# Patient Record
Sex: FEMALE | Race: WHITE
[De-identification: names, ages, dates, MRNs, and addresses within clinical notes are randomized per-mention and may not be internally consistent; named-entity substitution may affect disease eponyms.]

---

## 2020-10-17 ENCOUNTER — HOSPITAL ENCOUNTER (EMERGENCY)
Dept: HOSPITAL 65 - ER | Age: 60
LOS: 1 days | Discharge: HOME | End: 2020-10-18
Payer: MEDICARE

## 2020-10-17 VITALS — WEIGHT: 238 LBS | HEIGHT: 67 IN | BODY MASS INDEX: 37.35 KG/M2

## 2020-10-17 DIAGNOSIS — Z88.6: ICD-10-CM

## 2020-10-17 DIAGNOSIS — Z88.8: ICD-10-CM

## 2020-10-17 DIAGNOSIS — Z79.899: ICD-10-CM

## 2020-10-17 DIAGNOSIS — N39.0: Primary | ICD-10-CM

## 2020-10-17 DIAGNOSIS — Z88.2: ICD-10-CM

## 2020-10-17 DIAGNOSIS — Z88.0: ICD-10-CM

## 2020-10-17 DIAGNOSIS — Z90.49: ICD-10-CM

## 2020-10-17 DIAGNOSIS — I11.0: ICD-10-CM

## 2020-10-17 DIAGNOSIS — I25.10: ICD-10-CM

## 2020-10-17 DIAGNOSIS — E78.00: ICD-10-CM

## 2020-10-17 DIAGNOSIS — Z90.710: ICD-10-CM

## 2020-10-17 DIAGNOSIS — Z88.5: ICD-10-CM

## 2020-10-17 DIAGNOSIS — E11.9: ICD-10-CM

## 2020-10-17 DIAGNOSIS — M79.7: ICD-10-CM

## 2020-10-17 DIAGNOSIS — Z79.84: ICD-10-CM

## 2020-10-17 DIAGNOSIS — I50.9: ICD-10-CM

## 2020-10-17 DIAGNOSIS — J44.9: ICD-10-CM

## 2020-10-17 PROCEDURE — 85025 COMPLETE CBC W/AUTO DIFF WBC: CPT

## 2020-10-17 PROCEDURE — 74176 CT ABD & PELVIS W/O CONTRAST: CPT

## 2020-10-17 PROCEDURE — 99284 EMERGENCY DEPT VISIT MOD MDM: CPT

## 2020-10-17 PROCEDURE — 96372 THER/PROPH/DIAG INJ SC/IM: CPT

## 2020-10-17 PROCEDURE — 36415 COLL VENOUS BLD VENIPUNCTURE: CPT

## 2020-10-17 PROCEDURE — 80053 COMPREHEN METABOLIC PANEL: CPT

## 2020-10-17 PROCEDURE — 87086 URINE CULTURE/COLONY COUNT: CPT

## 2020-10-17 PROCEDURE — 81000 URINALYSIS NONAUTO W/SCOPE: CPT

## 2020-10-18 VITALS — SYSTOLIC BLOOD PRESSURE: 115 MMHG | DIASTOLIC BLOOD PRESSURE: 62 MMHG

## 2020-10-18 LAB
ALP INTEST CFR SERPL: 55 U/L (ref 50–136)
ALT SERPL-CCNC: 27 U/L (ref 12–78)
APPEARANCE UR: CLEAR
AST SERPL-CCNC: 21 U/L (ref 0–35)
BASOPHILS # BLD AUTO: 0.1 10^3/UL (ref 0–0.1)
BASOPHILS NFR BLD AUTO: 0.4 % (ref 0–0.2)
BILIRUB UR STRIP.AUTO-MCNC: NEGATIVE MG/DL
CALCIUM SERPL-MCNC: 9.3 MG/DL (ref 8.4–10.5)
CO2 BLDA-SCNC: 30.7 MMOL/L (ref 20–32)
COLOR UR: YELLOW
EOSINOPHIL # BLD AUTO: 0.2 10^3/UL (ref 0–0.2)
EOSINOPHIL NFR BLD AUTO: 2 % (ref 0–5)
ERYTHROCYTE [DISTWIDTH] IN BLOOD BY AUTOMATED COUNT: 13.4 % (ref 11.5–14.5)
GLUCOSE PRE 100 G GLC PO SERPL-MCNC: 154 MG/DL (ref 70–110)
LYMPHOCYTES # BLD AUTO: 2.35 10^3/UL1 (ref 1–4.8)
LYMPHOCYTES NFR BLD AUTO: 20.4 % (ref 24–44)
MCH RBC QN AUTO: 30.4 PG (ref 26–34)
MONOCYTES # BLD AUTO: 0.8 10^3/UL (ref 0.3–0.8)
MONOCYTES NFR BLD AUTO: 6.8 % (ref 5–12)
NEUTROPHILS # BLD AUTO: 8 10^3/UL (ref 1.8–7.7)
NEUTROPHILS NFR BLD AUTO: 70 % (ref 41–85)
PLATELET # BLD AUTO: 265 10^3/UL (ref 150–400)
UROBILINOGEN UR QL STRIP.AUTO: NORMAL
YEAST URNS QL MICRO: (no result)

## 2020-10-18 NOTE — DIREP
PROCEDURE:CT ABDOMEN/PELVIS W/O CONTRAST

 

COMPARISON:[facility], CT, CT ABD/PELVIS W/O, 04/05/2018, 11:07 

AM.  [facility], CT, CT ABD/PELVIS W/O, 09/08/2017, 01:05 PM.  

[facility], CT, CT ABD/PELVIS W/O, 08/05/2017, 06:09 PM.

 

INDICATIONS:Right flank pain

 

TECHNIQUE:Axial images were created through the abdomen and pelvis without 

intravenous contrast material. 

No oral contrast was administered.

Sagittal and coronal reconstructions were performed from source images.

 

FINDINGS:

LUNG BASES:Small left pleural effusion.  The lung bases are otherwise clear.

LIVER:Normal.  No significant liver lesions are identified.  

BILIARY:Normal.  No visible dilatation or calcification.  

PANCREAS:Normal.  No lesion, fluid collection, ductal dilatation, or atrophy.  

 

SPLEEN:Normal.  No enlargement or focal lesion. 

ADRENALS:Normal.  No mass or enlargement.  

URINARY TRACT:Bilateral nonobstructing nephrolithiasis, measuring up to 3 mm 

in size.  No distal urolith or hydronephrosis noted.  Stable bilateral renal 

parenchymal scarring.

AORTA/VASCULAR:There are aortic atherosclerotic calcifications present. No 

aneurysm. 

RETROPERITONEUM:Normal.  No mass or adenopathy.  

BOWEL/MESENTERY:There is mild colonic diverticulosis without evidence for 

diverticulitis. There is no intestinal obstruction, free fluid, free air or 

mesenteric inflammatory changes.  

ABDOMINAL WALL:Normal.  No mass or hernia.  

PELVIC ORGANS:The uterus is surgically absent. No visible mass.  

BONES:Degenerative changes.  No acute abnormality noted.

OTHER:Negative.  

 

CONCLUSION:1.  Bilateral nonobstructing nephrolithiasis.  No distal urolith or 

hydronephrosis noted.

2.  Stable bilateral renal parenchymal scarring consistent with sequela of 

pyelonephritis.

3.  Mild colonic diverticulosis.

4.  Small left pleural effusion.

 

 

 

Dictated by: CRISTIAN Muro M.D. on 10/18/2020 at 01:55 AM     

Electronically Signed By: CRISTIAN Muro M.D. on 10/18/2020 at 01:59 AM

## 2020-10-18 NOTE — ER.PDOC
General


Chief Complaint:  Requesting Medical Care


Stated Complaint:  R KIDNEY PAIN


Time seen by MD:  01:22


Source:  patient


Exam Limitations:  no limitations





History of Present Illness


Initial Comments


Right flank pain for 1 week


Timing/Duration:  1 week


Severity/Quality:  moderate, sharpness


Radiation:  RLQ


Associated Symptoms:  denies symptoms


Exacerbated by:  nothing


Relieved By:  nothing


Allergies:  


Coded Allergies:  


     Penicillins (Verified  Allergy, Severe, unknown, 1/24/17)


   


   "CONVULSIONS"


     aspirin (Verified  Allergy, Severe, 1/24/17)


   


   "CONVULSIONS"


     cefamandole nafate (Verified  Allergy, Severe, 1/24/17)


     iodine (Verified  Allergy, Severe, Shortness of Breath, 1/24/17)


     quetiapine (Verified  Allergy, Severe, "I GO CRAZY, OUT OF MY HEAD", 

1/24/17)


     Cephalexin Monohydrate (Verified  Allergy, Intermediate, Hives, 1/24/17)


     egg (Verified  Allergy, Intermediate, Headache, 1/24/17)


     latex (Verified  Allergy, Intermediate, HIVES, 1/24/17)


     hydrocodone bitartrate (Verified  Allergy, Unknown, 1/24/17)


     phenazopyridine HCl (Verified  Allergy, Unknown, 1/24/17)


     codeine (Verified  Adverse Reaction, Mild, 1/24/17)


   


   "I GO CRAZY"


     fentanyl (Verified  Adverse Reaction, Mild, 1/24/17)


   


   "I GO CRAZY"


Uncoded Allergies:  


     SULFA (Allergy, Unknown, 5/28/14)


Home Meds


Active Scripts


Levofloxacin (LEVAQUIN) 750 Mg Tablet, 750 MG PO DAILY, #7 TAB 0 Refills


   Prov:MARISABEL CONTEH MD         5/7/20


Oxcarbazepine (OXCARBAZEPINE) 150 Mg Tablet, 300 MG PO DAILY for 30 Days, TAB


   Prov:DWAIN WINKLER MD         1/10/20


Losartan Potassium (COZAAR) 50 Mg Tablet, 100 MG PO DAILY for 30 Days, #30 TAB


   Prov:DWAIN WINKLER MD         1/10/20


Prednisone (PREDNISONE) 20 Mg Tablet, 40 MG PO DAILY24 for 30 Days


   Prov:DWAIN WINKLER MD         12/5/19


Pantoprazole Sodium (PROTONIX) 40 Mg Tablet.dr, 40 MG PO DAILY for 30 Days, #30


   Prov:DWAIN WINKLER MD         12/5/19


Lancets (ACCU-CHEK) 1 Each Each, 1 EACH MC Q6 for 30 Days, #30 EACH


   Prov:DWAIN WINKLER MD         7/12/19


Amlodipine Besylate (NORVASC) 5 Mg Tablet, 5 MG PO DAILY for 30 Days, TABLET


   Prov:DWAIN WINKLER MD         7/12/19


Reported Medications


Carvedilol 25MG (COREG 25MG) 25 Mg Tablet, 1 TAB PO BID, #60 TAB 5 Refills


   12/3/19


Clonidine Hcl (CLONIDINE HCL) 0.2 Mg Tablet, 1 TAB PO HS, #30 TAB 2 Refills


   12/2/19


Tramadol Hcl (TRAMADOL HCL) 50 Mg Tablet, 50 MG PO PRN PRN for PAIN, TABLET


   12/2/19


Metformin Hcl (METFORMIN HCL) 500 Mg Tablet, 1 TAB PO BID, #60 TAB 3 Refills


   12/2/19


Rosuvastatin 10MG (CRESTOR 10MG) 10 Mg Tablet, 1 TAB PO DAILY, #30 TAB 5 Refills


   12/2/19


Duloxetine Hcl (CYMBALTA) 60 Mg Capsule.dr, 60 MG PO DAILY24


   7/10/19


Fenofibrate (FENOFIBRATE) 160 Mg Tablet, 160 MG PO DAILY24, TABLET


   7/10/19


Ondansetron (ZOFRAN ODT) 8 Mg Tab.rapdis, 8 MG PO DAILY24 for N/V, TAB


   8/6/18


Dasatinib (SPRYCEL) 100 Mg Tablet, 90 MG PO DAILY24, TABLET


   8/6/18


Potassium Citrate (POTASSIUM CITRATE) 10 Meq Tablet.er, 10 MEQ PO DAILY24


   8/6/18


Bupropion Hcl (BUPROPION XL) 300 Mg Tab.er.24h, 300 MG PO DAILY24


   8/6/18


Ondansetron (ZOFRAN ODT) 8 Mg Tab.rapdis, 8 MG PO DAILY24 for N/V, TAB


   8/6/18


Dasatinib (SPRYCEL) 80 Mg Tablet, 90 MG PO DAILY24, TABLET


   8/6/18


Albuterol Sulfate (VENTOLIN HFA) 18 Gm Hfa.aer.ad, 90 MCG IH Q6HR PRN for 

SHORTNESS OF BREATH


   8/6/18


Allopurinol (ALLOPURINOL) 300 Mg Tablet, 1 TAB PO DAILY


   8/6/18


Duloxetine Hcl (CYMBALTA) 60 Mg Capsule.dr, 60 MG PO DAILY


   1/24/17


Tiotropium Bromide (SPIRIVA) 18 Mcg Cap.w.dev, 1 CAP IH DAILY


   10/14/16


Furosemide (LASIX) 20 Mg Tablet, 1 TAB PO DAILY


   9/24/15


Tizanidine Hcl (TIZANIDINE HCL) 4 Mg Tablet, 1 TAB PO TID PRN for MUSCLE SPASM


   9/24/15


Gabapentin (NEURONTIN) 300 Mg Capsule, 2 CAP PO TID


   11/21/14


Alprazolam (XANAX) 2 Mg Tablet, 2 MG PO TID, TABLET


   11/17/14


Linaclotide (LINZESS) 290 Mcg Capsule, 290 MCG PO DAILY, CAPSULE


   11/17/14


Cholecalciferol (Vitamin D3) (VITAMIN D) 10,000 Unit Capsule, 35491 UNIT PO 3 

TIMES A WEEK, CAPSULE


   11/17/14


Levothyroxine Sodium (LEVOTHYROXINE SODIUM) 200 Mcg Tablet, 150 MCG PO DAILY, 

TABLET


   6/13/14


Vital Signs





First Vital Signs








  Date Time  Temp Pulse Resp B/P (MAP) Pulse Ox O2 Delivery O2 Flow Rate FiO2


 


10/18/20 01:28 98.2 94 18 115/62 (79) 95   








Last Vital Signs








  Date Time  Temp Pulse Resp B/P (MAP) Pulse Ox O2 Delivery O2 Flow Rate FiO2


 


10/18/20 01:28 98.2 94 18     


 


10/18/20 01:28     95   


 


10/18/20 01:28    115/62 (79)    











Past Medical History


Medical History:  cancer, coronary artery disease, cardiac problems, congestive 

heart failure, COPD, diabetes, fibromyalgia, high cholesterol, hypertension, 

renal disease, other


Surgical History:  appendectomy, cholecystectomy, hysterectomy, tonsillectomy, 

other





Social History


Drug Use:  none





Constitutional:  no symptoms reported


EENTM:  no symptoms reported


Respiratory:  no symptoms reported


Cardiovascular:  no symptoms reported


Gastrointestinal:  see HPI


Genitourinary:  see HPI


Musculoskeletal:  no symptoms reported


All Other Systems:  Reviewed and Negative





Physical Exam


General Appearance:  Obese


Neck:  Non-Tender, Full Range of Motion, Supple, Normal Inspection


Respiratory:  chest non-tender, lungs clear, normal breath sounds, no 

respiratory distress, no accessory muscle use


Cardiovascular:  Normal Peripheral Pulses, Regular Rate, Rhythm, No Edema, No 

Gallop, No JVD, No Murmur


Gastrointestinal:  Normal Bowel Sounds, No Organomegaly, No Pulsatile Mass, 

Guarding, Tenderness (RLQ)


Back:  CVA Tenderness (R)


Extremities:  Normal Range of Motion, Non-Tender, Normal Inspection, No Pedal 

Edema, No Calf Tenderness, Normal Capillary Refill, Pelvis Stable


Neurologic/Psychiatric:  CNs II-XII NML as Tested, No Motor/Sensory Deficits, 

Alert, Normal Mood/Affect, Oriented x 3


Skin:  Normal Color, Warm/Dry


Lymphatic:  No Adenopathy





Results/Orders


Results/Orders





Orders - STEPHANIE ALLRED MD


Cbc With Auto Diff (10/18/20 01:18)


Comprehensive Metabolic Panel (10/18/20 01:18)


Urinalysis (10/18/20 01:18)


Ct Abd/Pelvis Wo Iv Contrast (10/18/20 01:18)


Morphine Sulfate (Morphine Sulfate) (10/18/20 01:18)


Morphine Sulfate (Morphine Sulfate) (10/18/20 01:47)


Urine Culture (10/18/20 01:35)





Vital Signs








  Date Time  Temp Pulse Resp B/P (MAP) Pulse Ox O2 Delivery O2 Flow Rate FiO2


 


10/18/20 01:28 98.2 94 18     


 


10/18/20 01:28 98.2 94 18  95   


 


10/18/20 01:28 98.2 94 18 115/62 (79) 95   








Administered Medications








 Medications


  (Trade)  Dose


 Ordered  Sig/Tesfaye


 Route


 PRN Reason  Start Time


 Stop Time Status Last Admin


Dose Admin


 


 Morphine Sulfate


  (Morphine


 Sulfate)  4 mg  STAT  STAT


 IM


   10/18/20 01:18


 10/18/20 01:22 DC 10/18/20 01:55


4 MG








                                Laboratory Tests








Test


 10/18/20


01:35 10/18/20


01:36


 


Urine Collection Type VOID   


 


Urine Color


 YELLOW


(YELLOW) 





 


Urine Appearance CLEAR (CLEAR)   


 


Urine Bilirubin


 NEGATIVE MG/DL


(NEGATIVE) 





 


Urine Ketones


 NEGATIVE


(NEGATIVE) 





 


Urine Specific Gravity


 1.020


(1.005-1.035) 





 


Urine pH 6.5 (5.0-6.0)   


 


Urine Protein


 NEGATIVE


(NEGATIVE) 





 


Urine Urobilinogen


 NORMAL


(NEGATIVE) 





 


Urine Nitrate


 NEGATIVE


(NEGATIVE) 





 


Urine Leukocyte Esterase


 25 /uL  TRACE


(NEGATIVE) 





 


Urine Blood


 NEGATIVE


(NEGATIVE) 





 


Urine RBC


 0-2 RBC/HPF


(NONE SEEN) 





 


Urine WBC


 5-10 WBC/HPF


(0-2)  H 





 


Urine Squamous Epithelial


Cells FEW #/HPF


(FEW) 





 


Urine Bacteria


 FEW (NONE


SEEN)  H 





 


Urine Yeast FEW   


 


Urine Glucose


 NEGATIVE


(NEGATIVE) 





 


White Blood Count


 


 11.5 10^3/uL


(4.5-11.0)  H


 


Red Blood Count


 


 3.85 10^6/uL


(4.00-5.20)  L


 


Hemoglobin


 


 11.7 g/dL


(12.0-15.0)  L


 


Hematocrit


 


 36.6 %


(36.0-46.0)


 


Mean Corpuscular Volume


 


 95.1 fL


()


 


Mean Corpuscular Hemoglobin


 


 30.4 pg


(26-34)


 


Mean Corpuscular Hemoglobin


Concent 


 32.0 g/dL


(33-36.5)  L


 


Red Cell Distribution Width


 


 13.4 %


(11.5-14.5)


 


Platelet Count


 


 265 10^3/uL


(150-400)


 


Mean Platelet Volume


 


 9.4 fL


(7.8-11.0)


 


Neutrophils (%) (Auto)


 


 70.0 %


(41.0-85.0)


 


Lymphocytes (%) (Auto)


 


 20.4 %


(24.0-44.0)  L


 


Monocytes (%) (Auto)


 


 6.8 %


(5.0-12.0)


 


Neutrophils # (Auto)


 


 8.0 10^3/uL


(1.8-7.7)  H


 


Lymphocytes # (Auto)


 


 2.35 10^3/uL1


(1.0-4.8)


 


Monocytes # (Auto)


 


 0.8 10^3/uL


(0.3-0.8)


 


Absolute Immature Granulocyte


(auto 


 0.05 10^3 u/L


(0-2)


 


Absolute Eosinophils (auto)


 


 0.2 10^3/uL


(0.0-0.2)


 


Immature Granulocytes %


 


 0.40 %


(0.00-0.50)


 


Eosinophils %


 


 2.0 %


(0.0-5.0)


 


Basophils %


 


 0.4 %


(0.0-0.2)  H


 


Basophils #


 


 0.1 10^3/uL


(0.0-0.1)


 


Sodium Level


 


 143 mmol/L


(132-145)


 


Potassium Level


 


 4.4 mmol/L


(3.6-5.2)


 


Chloride Level


 


 102.0 mmol/L


()


 


Carbon Dioxide Level


 


 30.7 mmol/L


(20.0-32)


 


Anion Gap  14.7  


 


Blood Urea Nitrogen


 


 12 mg/dL


(7-18)


 


Creatinine


 


 1.33 mg/dL


(0.59-1.40)


 


Estimated GFR (


American) 


 49.6 (>/=60)  





 


Est GFR (CKD-EPI)(Non-Afr


American) 


 41.0 (>/=60)  





 


BUN/Creatinine Ratio  9.0  


 


Glucose Level


 


 154 mg/dL


()  H


 


Calcium Level


 


 9.3 mg/dL


(8.4-10.5)


 


Total Bilirubin


 


 0.2 mg/dL


(0.2-1.0)


 


Aspartate Amino Transferase


(AST) 


 21 U/L (0-35)  





 


Alanine Aminotransferase (ALT)


 


 27 U/L (12-78)





 


Alkaline Phosphatase


 


 55 U/L


()


 


Total Protein


 


 7.6 g/dL


(6.4-8.2)


 


Albumin


 


 4.1 g/dL


(3.4-5.0)


 


Globulin  3.5  


 


Albumin/Globulin Ratio  1.171  











Progress


Progress


CT abdomen/pelvis: 1.  Bilateral nonobstructing nephrolithiasis.  No distal 

urolith or 


hydronephrosis noted.


2.  Stable bilateral renal parenchymal scarring consistent with sequela of 


pyelonephritis.


3.  Mild colonic diverticulosis.


4.  Small left pleural effusion.





ER DEPART


Departure


Time of Disposition:  02:13


Disposition:  01 HOME, SELF-CARE


Impression:  


   Primary Impression:  


   UTI (lower urinary tract infection)


   Additional Impression:  


   Right flank pain


Condition:  Stable


Referrals:  


SAURABH BOWERS NP (PCP)


PRIMARY CARE PROVIDER





Additional Instructions:  


Macrobid


F/U with your PCP in 1 week


Return to ED if worsening symptoms or concerns


Duration or Time Spent with Pa:  45 min





Problem Qualifiers











STEPHANIE ALLRED MD                Oct 18, 2020 01:24

## 2020-12-16 ENCOUNTER — HOSPITAL ENCOUNTER (OUTPATIENT)
Dept: HOSPITAL 65 - NPLAB | Age: 60
Discharge: HOME | End: 2020-12-16
Attending: NURSE PRACTITIONER
Payer: MEDICARE

## 2020-12-16 ENCOUNTER — HOSPITAL ENCOUNTER (OUTPATIENT)
Dept: HOSPITAL 65 - LAB | Age: 60
Discharge: HOME | End: 2020-12-16
Attending: SPECIALIST
Payer: MEDICARE

## 2020-12-16 DIAGNOSIS — I10: ICD-10-CM

## 2020-12-16 DIAGNOSIS — R05: Primary | ICD-10-CM

## 2020-12-16 DIAGNOSIS — Z79.899: ICD-10-CM

## 2020-12-16 DIAGNOSIS — R50.9: ICD-10-CM

## 2020-12-16 DIAGNOSIS — Z20.828: Primary | ICD-10-CM

## 2020-12-16 DIAGNOSIS — E11.9: ICD-10-CM

## 2020-12-16 LAB
ALP INTEST CFR SERPL: 47 U/L (ref 50–136)
ALT SERPL-CCNC: 23 U/L (ref 12–78)
AST SERPL-CCNC: 12 U/L (ref 0–35)
BASOPHILS # BLD AUTO: 0 10^3/UL (ref 0–0.1)
BASOPHILS NFR BLD AUTO: 0.5 % (ref 0–0.2)
CALCIUM SERPL-MCNC: 9.8 MG/DL (ref 8.4–10.5)
CO2 BLDA-SCNC: 36.4 MMOL/L (ref 20–32)
EOSINOPHIL # BLD AUTO: 0.2 10^3/UL (ref 0–0.2)
EOSINOPHIL NFR BLD AUTO: 3.2 % (ref 0–5)
ERYTHROCYTE [DISTWIDTH] IN BLOOD BY AUTOMATED COUNT: 13.6 % (ref 11.5–14.5)
GLUCOSE PRE 100 G GLC PO SERPL-MCNC: 208 MG/DL (ref 70–110)
LYMPHOCYTES # BLD AUTO: 1.57 10^3/UL1 (ref 1–4.8)
LYMPHOCYTES NFR BLD AUTO: 25 % (ref 24–44)
MCH RBC QN AUTO: 30.4 PG (ref 26–34)
MONOCYTES # BLD AUTO: 0.6 10^3/UL (ref 0.3–0.8)
MONOCYTES NFR BLD AUTO: 8.8 % (ref 5–12)
NEUTROPHILS # BLD AUTO: 3.9 10^3/UL (ref 1.8–7.7)
NEUTROPHILS NFR BLD AUTO: 62.3 % (ref 41–85)
PLATELET # BLD AUTO: 211 10^3/UL (ref 150–400)

## 2020-12-16 PROCEDURE — 85379 FIBRIN DEGRADATION QUANT: CPT

## 2020-12-16 PROCEDURE — 83615 LACTATE (LD) (LDH) ENZYME: CPT

## 2020-12-16 PROCEDURE — 84484 ASSAY OF TROPONIN QUANT: CPT

## 2020-12-16 PROCEDURE — 85025 COMPLETE CBC W/AUTO DIFF WBC: CPT

## 2020-12-16 PROCEDURE — 87635 SARS-COV-2 COVID-19 AMP PRB: CPT

## 2020-12-16 PROCEDURE — 87426 SARSCOV CORONAVIRUS AG IA: CPT

## 2020-12-16 PROCEDURE — 36415 COLL VENOUS BLD VENIPUNCTURE: CPT

## 2020-12-16 PROCEDURE — 84145 PROCALCITONIN (PCT): CPT

## 2020-12-16 PROCEDURE — 82728 ASSAY OF FERRITIN: CPT

## 2020-12-16 PROCEDURE — 80053 COMPREHEN METABOLIC PANEL: CPT

## 2020-12-16 PROCEDURE — 86140 C-REACTIVE PROTEIN: CPT

## 2020-12-18 ENCOUNTER — HOSPITAL ENCOUNTER (OUTPATIENT)
Dept: HOSPITAL 65 - RAD | Age: 60
Discharge: HOME | End: 2020-12-18
Attending: SPECIALIST
Payer: MEDICARE

## 2020-12-18 DIAGNOSIS — J90: ICD-10-CM

## 2020-12-18 DIAGNOSIS — R50.9: ICD-10-CM

## 2020-12-18 DIAGNOSIS — R91.1: Primary | ICD-10-CM

## 2020-12-18 DIAGNOSIS — R05: ICD-10-CM

## 2020-12-18 DIAGNOSIS — R91.8: ICD-10-CM

## 2020-12-18 PROCEDURE — 71250 CT THORAX DX C-: CPT

## 2020-12-18 NOTE — DIREP
PROCEDURE:CT CHEST WITHOUT CONTRAST

 

TECHNIQUE:Axial cuts were obtained through the chest, without intravenous 

contrast material.  The images were viewed at lung and soft tissue settings.  

Sagittal and coronal reconstructions are provided.  

 

COMPARISON:[facility], CT, CT-CHEST W/O ABD W/O PEL W/O CONTRAST, 

01/25/2013, 06:43 PM.  [facility], CT, CT ABD/PELVIS W/O, 

10/18/2020, 01:35 AM.  [facility], CR, XRAY CHEST 2 VWS, 

11/05/2020, 06:39 AM.

 

INDICATIONS:R05 COUGH, R50.9 FEVER

 

FINDINGS:

LUNGS:Very faint diffuse ground-glass appearance of the right upper lobe.  

Right middle lobe nodule 0.6 cm.  Stable small left pleural effusion.

CARDIAC:Normal size heart, mild coronary artery calcifications and normal 

pulmonary vascularity.  

THORACIC AORTA:Calcification without dilatation.

MEDIASTINUM/MARCI:No pathologic adenopathy.

CHEST WALL:Normal.

LIMITED ABDOMEN:Cholecystectomy.

BONES:Lower thoracic spondylosis.

THYROID:Normal.

OTHER:No additional findings.  

 

CONCLUSION:

1.  Mild diffuse right upper lobe ground-glass infiltrates.

2.  0.5 cm right middle lobe nodule.

3.  Small left pleural effusion.  

 

 

 

Dictated by: Anastasia Gomez MD on 12/18/2020 at 04:18 PM     

Electronically Signed By: Anastasia Gomez MD on 12/18/2020 at 04:29 PM

## 2021-01-06 ENCOUNTER — HOSPITAL ENCOUNTER (INPATIENT)
Dept: HOSPITAL 65 - MS | Age: 61
LOS: 2 days | Discharge: HOME | DRG: 660 | End: 2021-01-08
Attending: INTERNAL MEDICINE | Admitting: INTERNAL MEDICINE
Payer: MEDICARE

## 2021-01-06 VITALS — SYSTOLIC BLOOD PRESSURE: 159 MMHG | DIASTOLIC BLOOD PRESSURE: 79 MMHG

## 2021-01-06 VITALS — SYSTOLIC BLOOD PRESSURE: 154 MMHG | DIASTOLIC BLOOD PRESSURE: 86 MMHG

## 2021-01-06 VITALS — HEIGHT: 67 IN | WEIGHT: 251.31 LBS | BODY MASS INDEX: 39.44 KG/M2

## 2021-01-06 DIAGNOSIS — E86.0: ICD-10-CM

## 2021-01-06 DIAGNOSIS — Z79.899: ICD-10-CM

## 2021-01-06 DIAGNOSIS — Z82.49: ICD-10-CM

## 2021-01-06 DIAGNOSIS — N13.2: Primary | ICD-10-CM

## 2021-01-06 DIAGNOSIS — Z88.2: ICD-10-CM

## 2021-01-06 DIAGNOSIS — Z88.5: ICD-10-CM

## 2021-01-06 DIAGNOSIS — Z88.1: ICD-10-CM

## 2021-01-06 DIAGNOSIS — E11.9: ICD-10-CM

## 2021-01-06 DIAGNOSIS — Z87.01: ICD-10-CM

## 2021-01-06 DIAGNOSIS — Z82.5: ICD-10-CM

## 2021-01-06 DIAGNOSIS — Z90.710: ICD-10-CM

## 2021-01-06 DIAGNOSIS — Z82.3: ICD-10-CM

## 2021-01-06 DIAGNOSIS — I50.32: ICD-10-CM

## 2021-01-06 DIAGNOSIS — I11.0: ICD-10-CM

## 2021-01-06 DIAGNOSIS — Z90.49: ICD-10-CM

## 2021-01-06 DIAGNOSIS — G47.33: ICD-10-CM

## 2021-01-06 DIAGNOSIS — C92.10: ICD-10-CM

## 2021-01-06 DIAGNOSIS — J44.9: ICD-10-CM

## 2021-01-06 DIAGNOSIS — Z91.041: ICD-10-CM

## 2021-01-06 DIAGNOSIS — Z91.040: ICD-10-CM

## 2021-01-06 DIAGNOSIS — Z91.012: ICD-10-CM

## 2021-01-06 DIAGNOSIS — Z82.0: ICD-10-CM

## 2021-01-06 DIAGNOSIS — E03.9: ICD-10-CM

## 2021-01-06 DIAGNOSIS — Z99.81: ICD-10-CM

## 2021-01-06 DIAGNOSIS — K92.1: ICD-10-CM

## 2021-01-06 DIAGNOSIS — Z88.0: ICD-10-CM

## 2021-01-06 DIAGNOSIS — Z88.8: ICD-10-CM

## 2021-01-06 DIAGNOSIS — Z80.0: ICD-10-CM

## 2021-01-06 DIAGNOSIS — E66.9: ICD-10-CM

## 2021-01-06 DIAGNOSIS — Z98.891: ICD-10-CM

## 2021-01-06 DIAGNOSIS — Z88.6: ICD-10-CM

## 2021-01-06 LAB
ALP INTEST CFR SERPL: 43 U/L (ref 50–136)
ALT SERPL-CCNC: 23 U/L (ref 12–78)
APPEARANCE UR: (no result)
AST SERPL-CCNC: 15 U/L (ref 0–35)
BASOPHILS # BLD AUTO: 0 10^3/UL (ref 0–0.1)
BASOPHILS NFR BLD AUTO: 0.3 % (ref 0–0.2)
BILIRUB UR STRIP.AUTO-MCNC: (no result) MG/DL
CALCIUM SERPL-MCNC: 9.9 MG/DL (ref 8.4–10.5)
CO2 BLDA-SCNC: 34.3 MMOL/L (ref 20–32)
COLOR UR: (no result)
EOSINOPHIL # BLD AUTO: 0.2 10^3/UL (ref 0–0.2)
EOSINOPHIL NFR BLD AUTO: 1.5 % (ref 0–5)
ERYTHROCYTE [DISTWIDTH] IN BLOOD BY AUTOMATED COUNT: 13.7 % (ref 11.5–14.5)
GLUCOSE PRE 100 G GLC PO SERPL-MCNC: 182 MG/DL (ref 70–110)
LYMPHOCYTES # BLD AUTO: 1.68 10^3/UL1 (ref 1–4.8)
LYMPHOCYTES NFR BLD AUTO: 15.2 % (ref 24–44)
MCH RBC QN AUTO: 30.2 PG (ref 26–34)
MONOCYTES # BLD AUTO: 0.7 10^3/UL (ref 0.3–0.8)
MONOCYTES NFR BLD AUTO: 5.9 % (ref 5–12)
NEUTROPHILS # BLD AUTO: 8.5 10^3/UL (ref 1.8–7.7)
NEUTROPHILS NFR BLD AUTO: 76.8 % (ref 41–85)
PLATELET # BLD AUTO: 207 10^3/UL (ref 150–400)
UROBILINOGEN UR QL STRIP.AUTO: NORMAL

## 2021-01-06 PROCEDURE — A4217 STERILE WATER/SALINE, 500 ML: HCPCS

## 2021-01-06 PROCEDURE — 85651 RBC SED RATE NONAUTOMATED: CPT

## 2021-01-06 PROCEDURE — 81000 URINALYSIS NONAUTO W/SCOPE: CPT

## 2021-01-06 PROCEDURE — 85610 PROTHROMBIN TIME: CPT

## 2021-01-06 PROCEDURE — 74176 CT ABD & PELVIS W/O CONTRAST: CPT

## 2021-01-06 PROCEDURE — C1769 GUIDE WIRE: HCPCS

## 2021-01-06 PROCEDURE — 80053 COMPREHEN METABOLIC PANEL: CPT

## 2021-01-06 PROCEDURE — 94640 AIRWAY INHALATION TREATMENT: CPT

## 2021-01-06 PROCEDURE — 84145 PROCALCITONIN (PCT): CPT

## 2021-01-06 PROCEDURE — 71046 X-RAY EXAM CHEST 2 VIEWS: CPT

## 2021-01-06 PROCEDURE — 36415 COLL VENOUS BLD VENIPUNCTURE: CPT

## 2021-01-06 PROCEDURE — 76000 FLUOROSCOPY <1 HR PHYS/QHP: CPT

## 2021-01-06 PROCEDURE — 82272 OCCULT BLD FECES 1-3 TESTS: CPT

## 2021-01-06 PROCEDURE — 86140 C-REACTIVE PROTEIN: CPT

## 2021-01-06 PROCEDURE — 83630 LACTOFERRIN FECAL (QUAL): CPT

## 2021-01-06 PROCEDURE — 85025 COMPLETE CBC W/AUTO DIFF WBC: CPT

## 2021-01-06 PROCEDURE — 74420 UROGRAPHY RTRGR +-KUB: CPT

## 2021-01-06 PROCEDURE — 87230 TOXIN/ANTITOXIN ASY TISS CUL: CPT

## 2021-01-06 PROCEDURE — 82948 REAGENT STRIP/BLOOD GLUCOSE: CPT

## 2021-01-06 PROCEDURE — 87045 FECES CULTURE AEROBIC BACT: CPT

## 2021-01-06 PROCEDURE — C2617 STENT, NON-COR, TEM W/O DEL: HCPCS

## 2021-01-06 PROCEDURE — 93005 ELECTROCARDIOGRAM TRACING: CPT

## 2021-01-06 PROCEDURE — 80048 BASIC METABOLIC PNL TOTAL CA: CPT

## 2021-01-06 PROCEDURE — C1758 CATHETER, URETERAL: HCPCS

## 2021-01-06 PROCEDURE — 87086 URINE CULTURE/COLONY COUNT: CPT

## 2021-01-06 RX ADMIN — MORPHINE SULFATE PRN MG: 4 INJECTION, SOLUTION INTRAMUSCULAR; INTRAVENOUS at 19:07

## 2021-01-06 RX ADMIN — MORPHINE SULFATE PRN MG: 4 INJECTION, SOLUTION INTRAMUSCULAR; INTRAVENOUS at 23:06

## 2021-01-06 NOTE — PCM.EKG
[facility]

                                       

Test Date:    2021               Test Time:    15:37:48

Pat Name:     MCKENZIE THURMAN          Department:   

Patient ID:   [id number]          Room:         331 A

Gender:       F                        Technician:   JESSICA

:          1960               Requested By: MARISABEL BLACKMON

Order Number: [id number]           Reading MD:   Marisabel Blackmon

                                 Measurements

Intervals                              Axis          

Rate:         86                       P:            67

MA:           170                      QRS:          27

QRSD:         118                      T:            51

QT:           405                                    

QTc:          485                                    

                           Interpretive Statements

Sinus rhythm

Consider left atrial enlargement

Incomplete right bundle branch block

Compared to ECG 2020 13:36:07

Incomplete right bundle-branch block now present

Electronically Signed On 2021 12:01:48 CST by Marisabel Blackmon



Please click the below link to view image of tracing.

## 2021-01-06 NOTE — PCM.HP
HISTORY & PHYSICAL


HISTORY & PHYSICAL


DATE: 2021





Patient is being admitted as an inpatient to [facility]





ADMITTING DIAGNOSES: Left lower quadrant pain with nausea vomiting dehydration 

with hematochezia





CHIEF COMPLAINT: Belly pain vomiting and blood in the stools





HISTORY OF PRESENT ILLNESS: 60-year-old female with history of CML who just 

finished antibiotics for a recent pneumonia.  She started to have increasing 

left lower quadrant pain with nausea and nonbilious vomiting along with blood in

her stool.  This has been going on for the past week.  She does report fever and

chills with some night sweats.  She denies any urinary symptoms and she denies 

any chest pain or shortness of breath.  She denies any recent trauma or recent 

travel.  There have been no sick contacts around her.  She was on Levaquin for 

her pneumonia recently.





PAST MEDICAL HISTORY: CML, COPD, recurrent pneumonias, hypothyroid





PAST SURGICAL HISTORY: Cholecystectomy, multiple hernia repairs with the last 

one having a mesh placement, total hysterectomy,  x2, exploratory 

laparotomy x2, breast biopsies which were all negative, colonoscopy





ALLERGIES: Penicillin, aspirin, codeine, iodine, fentanyl, Seroquel, Keflex





MEDICATIONS: I have reviewed her home medication list in [facility]





SOCIAL HISTORY: No tobacco, no drugs, no alcohol





OB history: She is a 





FAMILY HISTORY: Mom is  with a diagnosis of pulmonary fibrosis, maternal

grandmother with history of pancreatic cancer





PHYSICAL EXAMINATION:


VITAL SIGNS: Pulse 91, respirations 18, O2 sat of 99%, she is afebrile


HEENT: Oropharynx is dry but patent, nares are patent, no maxillary sinus 

tenderness, no nasal congestion noted, TMs are patent


NECK: No JVD noted, no bruits, no lymphadenopathy


HEART: S1 and S2 audible, she was not tachycardic, no gallop rhythms


LUNGS: CTA bilaterally


ABDOMEN: Bowel sounds are present, soft abdomen, she was tender to the left 

lower quadrant but no significant rebound noted


EXTREMITIES: Minimal edema to her legs, no purpura, no petechia, no cyanosis





LABORATORY DATA: WBC 11.0, hemoglobin 11.7, platelet count 207, sed rate 13, PT 

11.6, INR 1.1, sodium 140, potassium 3.2, chloride 95, BUN 17, creatinine 1.5, 

estimated GFR 36, glucose 182, LFTs are normal, albumin 4.2, procalcitonin less 

than 0.05





EKG: No tachycardia no significant ST segment changes noted





ASSESSMENT: We have this 60-year-old female with onset of left lower quadrant 

pain with hematochezia and nausea vomiting and dehydration





PLAN: I will go ahead and admit her to the hospital and start her on IV fluids 

for her the dehydration and some pain control.  I will schedule a CAT scan with 

contrast to evaluate for diverticulitis/colitis and draw some C. difficile toxin

panel on her as well with her recent use of antibiotics.





Total time spent on pt = 60 mins











MARISABEL CONTEH MD               2021 17:31

## 2021-01-06 NOTE — DIREP
PROCEDURE:CHEST X-RAY, PA & LATERAL

 

 

COMPARISON:[facility], CR, XRAY CHEST 2 VWS, 11/05/2020, 06:39 

AM.  [facility], CR, XRAY CHEST 2 VWS, 05/04/2020, 04:59 PM.

 

INDICATIONS:cough

 

FINDINGS:

LUNGS/PLEURA:No significant pulmonary parenchymal abnormalities. No effusions.

VASCULATURE:The pulmonary vasculature is normal.  

CARDIAC:The heart is borderline enlarged.  

MEDIASTINUM:Normal.  No visible mass or adenopathy.  

BONES:Normal.  No fracture or visible bony lesion.  

OTHER:Negative.  

 

CONCLUSION:

1.  Borderline cardiomegaly, without evidence of cardiac decompensation at this 

time.  

2.  No acute pulmonary abnormality is identified.

 

 

 

Dictated by: Elijah Carrizales MD on 01/06/2021 at 10:53 PM     

Electronically Signed By: Elijah Carrizales MD on 01/06/2021 at 10:54 PM

## 2021-01-07 VITALS — SYSTOLIC BLOOD PRESSURE: 140 MMHG | DIASTOLIC BLOOD PRESSURE: 63 MMHG

## 2021-01-07 VITALS — DIASTOLIC BLOOD PRESSURE: 85 MMHG | SYSTOLIC BLOOD PRESSURE: 161 MMHG

## 2021-01-07 VITALS — SYSTOLIC BLOOD PRESSURE: 166 MMHG | DIASTOLIC BLOOD PRESSURE: 73 MMHG

## 2021-01-07 VITALS — SYSTOLIC BLOOD PRESSURE: 150 MMHG | DIASTOLIC BLOOD PRESSURE: 65 MMHG

## 2021-01-07 VITALS — DIASTOLIC BLOOD PRESSURE: 83 MMHG | SYSTOLIC BLOOD PRESSURE: 164 MMHG

## 2021-01-07 VITALS — SYSTOLIC BLOOD PRESSURE: 158 MMHG | DIASTOLIC BLOOD PRESSURE: 94 MMHG

## 2021-01-07 VITALS — SYSTOLIC BLOOD PRESSURE: 154 MMHG | DIASTOLIC BLOOD PRESSURE: 71 MMHG

## 2021-01-07 VITALS — DIASTOLIC BLOOD PRESSURE: 70 MMHG | SYSTOLIC BLOOD PRESSURE: 160 MMHG

## 2021-01-07 VITALS — DIASTOLIC BLOOD PRESSURE: 86 MMHG | SYSTOLIC BLOOD PRESSURE: 169 MMHG

## 2021-01-07 VITALS — SYSTOLIC BLOOD PRESSURE: 164 MMHG | DIASTOLIC BLOOD PRESSURE: 83 MMHG

## 2021-01-07 VITALS — DIASTOLIC BLOOD PRESSURE: 80 MMHG | SYSTOLIC BLOOD PRESSURE: 152 MMHG

## 2021-01-07 VITALS — DIASTOLIC BLOOD PRESSURE: 69 MMHG | SYSTOLIC BLOOD PRESSURE: 170 MMHG

## 2021-01-07 VITALS — DIASTOLIC BLOOD PRESSURE: 90 MMHG | SYSTOLIC BLOOD PRESSURE: 186 MMHG

## 2021-01-07 PROCEDURE — BT1F1ZZ FLUOROSCOPY OF LEFT KIDNEY, URETER AND BLADDER USING LOW OSMOLAR CONTRAST: ICD-10-PCS | Performed by: UROLOGY

## 2021-01-07 PROCEDURE — 0TJB8ZZ INSPECTION OF BLADDER, VIA NATURAL OR ARTIFICIAL OPENING ENDOSCOPIC: ICD-10-PCS | Performed by: UROLOGY

## 2021-01-07 PROCEDURE — 0T778DZ DILATION OF LEFT URETER WITH INTRALUMINAL DEVICE, VIA NATURAL OR ARTIFICIAL OPENING ENDOSCOPIC: ICD-10-PCS | Performed by: UROLOGY

## 2021-01-07 PROCEDURE — 5A09357 ASSISTANCE WITH RESPIRATORY VENTILATION, LESS THAN 24 CONSECUTIVE HOURS, CONTINUOUS POSITIVE AIRWAY PRESSURE: ICD-10-PCS | Performed by: INTERNAL MEDICINE

## 2021-01-07 RX ADMIN — FLAVOXATE HYDROCHLORIDE SCH MG: 100 TABLET, FILM COATED ORAL at 21:00

## 2021-01-07 RX ADMIN — LOSARTAN POTASSIUM SCH MG: 50 TABLET, FILM COATED ORAL at 09:30

## 2021-01-07 RX ADMIN — ALPRAZOLAM SCH MG: 0.5 TABLET ORAL at 20:14

## 2021-01-07 RX ADMIN — CIPROFLOXACIN SCH MLS/HR: 2 INJECTION INTRAVENOUS at 10:12

## 2021-01-07 RX ADMIN — MORPHINE SULFATE PRN MG: 4 INJECTION, SOLUTION INTRAMUSCULAR; INTRAVENOUS at 06:25

## 2021-01-07 RX ADMIN — ALPRAZOLAM SCH MG: 0.5 TABLET ORAL at 14:08

## 2021-01-07 RX ADMIN — CIPROFLOXACIN SCH MLS/HR: 2 INJECTION INTRAVENOUS at 20:17

## 2021-01-07 RX ADMIN — IPRATROPIUM BROMIDE SCH MG: 500 SOLUTION RESPIRATORY (INHALATION) at 21:45

## 2021-01-07 RX ADMIN — GABAPENTIN SCH MG: 300 CAPSULE ORAL at 20:15

## 2021-01-07 RX ADMIN — AMLODIPINE BESYLATE SCH MG: 5 TABLET ORAL at 09:30

## 2021-01-07 RX ADMIN — GABAPENTIN SCH MG: 300 CAPSULE ORAL at 14:08

## 2021-01-07 RX ADMIN — FENOFIBRATE SCH MG: 145 TABLET ORAL at 09:30

## 2021-01-07 RX ADMIN — Medication SCH MG: at 09:30

## 2021-01-07 RX ADMIN — IPRATROPIUM BROMIDE SCH MG: 500 SOLUTION RESPIRATORY (INHALATION) at 15:00

## 2021-01-07 RX ADMIN — CARVEDILOL SCH MG: 12.5 TABLET, FILM COATED ORAL at 20:16

## 2021-01-07 RX ADMIN — MORPHINE SULFATE PRN MG: 4 INJECTION, SOLUTION INTRAMUSCULAR; INTRAVENOUS at 20:12

## 2021-01-07 RX ADMIN — CARVEDILOL SCH MG: 12.5 TABLET, FILM COATED ORAL at 09:30

## 2021-01-07 NOTE — PRM.PN
Subjective


Subjective


Date:  2021


Time:  09:15


Subjective


Pt still with LLQ pains but no more vomiting; no bloody stool overnight


Patient History:  


Asthma


  32 MOTHER, , Age:61


  G8 BROTHER, Age:50


  19 CHILD, Age:32


  19 CHILD, Age:29


Asthma


  32 MOTHER, , Age:61


  G8 BROTHER, Age:50


  19 CHILD, Age:32


  19 CHILD, Age:29


Bone cancer


  G8 SISTER, 


Cerebrovascular disorder


  G8 BROTHER, Age:57


Chronic obstructive pulmonary disease


  G8 BROTHER, Age:56


  G8 SISTER, 


FH: lung cancer


  G8 SISTER, 


FHx: brain cancer


  G8 SISTER, 


Hypertension


  33 FATHER, , Age:72


  G8 BROTHER, Age:57


  G8 BROTHER, Age:56


  G8 BROTHER, Age:55


  G8 BROTHER, Age:50


  G8 SISTER, Age:54


  19 CHILD, Age:29


  G8 SISTER, 


Hypertension


  33 FATHER, , Age:72


  G8 BROTHER, Age:57


  G8 BROTHER, Age:56


  G8 BROTHER, Age:55


  G8 BROTHER, Age:50


  G8 SISTER, Age:54


  19 CHILD, Age:29


  G8 SISTER, 





No Family History of:


  Alzheimer's disease


  Cerebrovascular disorder


  Chronic obstructive pulmonary disease


  Congestive heart failure


  Diabetes insipidus


  Diabetes mellitus


  Parkinson's disease





VTE


VTE Risk Total Score:  5


VTE Risk Score


VTE Risk:


Score 0-1 = Low Risk


(Aggressive mobilization; early ambulation; no VTE prophylaxis required)


Score 2: Moderate Risk


(Intermittent/Pneumatic Compression Device OR Lovenox/Heparin/Coumadin)


Score 3-4: High Risk


(Intermittent/Pneumatic Compression Device AND Lovenox/Heparin/Coumadin)


Score  > or =5: Highest Risk


(Intermittent/Pneumatic Compression Device AND Lovenox/Heparin/Coumadin)


Antico:Hep/LMWH/Coum/Xarelto:  No


Mechanical device ordered:  Yes


Reasons not ordering prophylax:  Bleeding, Renal impairment





Review of Systems


Constitutional:  Malaise; No: Fever, Chills, Sweats, Weakness


Eyes:  No: Pain, Vision change, Conjunctivae inflammation


ENT:  No: Ear pain, Ear discharge, Nose pain, Nose discharge, Nose congestion


Respiratory:  No: Cough, Dry, Shortness of breath, SOB with excertion, Wheezing


Cardiovascular:  No: Chest Pain, Palpitations, Orthopnea


Gastrointestinal:  Nausea, Abdominal Pain (LLQ), Hematochezia; No: Vomiting, 

Diarrhea, Constipation, Melena


Genitourinary:  No Dysuria, No Frequency, No Incontinence


Musculoskeletal:  No: neck pain, shoulder pain, arm pain


Skin:  No: Lesions, Jaundice


Neurological:  No: Change in speech, Confusion, Seizures


Allergies:  


Coded Allergies:  


     Penicillins (Verified  Allergy, Severe, unknown, 17)


   "CONVULSIONS"


     aspirin (Verified  Allergy, Severe, 17)


   "CONVULSIONS"


     cefamandole nafate (Verified  Allergy, Severe, 17)


     iodine (Verified  Allergy, Severe, Shortness of Breath, 17)


     quetiapine (Verified  Allergy, Severe, "I GO CRAZY, OUT OF MY HEAD", )


     Cephalexin Monohydrate (Verified  Allergy, Intermediate, Hives, 17)


     egg (Verified  Allergy, Intermediate, Headache, 17)


     latex (Verified  Allergy, Intermediate, HIVES, 17)


     hydrocodone bitartrate (Verified  Allergy, Unknown, 17)


     phenazopyridine HCl (Verified  Allergy, Unknown, 17)


     codeine (Verified  Adverse Reaction, Mild, 17)


   "I GO CRAZY"


     fentanyl (Verified  Adverse Reaction, Mild, 17)


   "I GO CRAZY"


Uncoded Allergies:  


     SULFA (Allergy, Unknown, 14)


Scheduled


Allopurinol (Allopurinol), 1 TAB PO DAILY, (Reported)


Alprazolam (Xanax), 2 MG PO TID, (Reported)


Amlodipine Besylate (Norvasc), 5 MG PO DAILY


Bupropion Hcl (Bupropion Xl), 300 MG PO DAILY24, (Reported)


Carvedilol 25MG (Coreg 25MG), 1 TAB PO BID, (Reported)


Cholecalciferol (Vitamin D3) (Vitamin D), 50,000 UNIT PO 3 TIMES A WEEK, 

(Reported)


Clonidine Hcl (Clonidine Hcl), 1 TAB PO HS, (Reported)


Dasatinib (Sprycel), 90 MG PO DAILY24, (Reported)


Dasatinib (Sprycel), 90 MG PO DAILY24, (Reported)


Duloxetine Hcl (Cymbalta), 60 MG PO DAILY, (Reported)


Duloxetine Hcl (Cymbalta), 60 MG PO DAILY24, (Reported)


Fenofibrate (Fenofibrate), 160 MG PO DAILY24, (Reported)


Furosemide (Lasix), 1 TAB PO DAILY, (Reported)


Gabapentin (Neurontin), 2 CAP PO TID, (Reported)


Levofloxacin (Levaquin), 750 MG PO DAILY


Levothyroxine Sodium (Levothyroxine Sodium), 150 MCG PO DAILY, (Reported)


Linaclotide (Linzess), 290 MCG PO DAILY, (Reported)


Losartan Potassium (Cozaar), 100 MG PO DAILY


Metformin Hcl (Metformin Hcl), 1 TAB PO BID, (Reported)


Ondansetron (Zofran Odt), 8 MG PO DAILY24, (Reported)


Ondansetron (Zofran Odt), 8 MG PO DAILY24, (Reported)


Oxcarbazepine (Oxcarbazepine), 300 MG PO DAILY


Pantoprazole Sodium (Protonix), 40 MG PO DAILY


Potassium Citrate (Potassium Citrate), 10 MEQ PO DAILY24, (Reported)


Prednisone (Prednisone), 40 MG PO DAILY24


Rosuvastatin 10MG (Crestor 10MG), 1 TAB PO DAILY, (Reported)


Tiotropium Bromide (Spiriva), 1 CAP IH DAILY, (Reported)





Scheduled PRN


Albuterol Sulfate (Ventolin Hfa), 90 MCG IH Q6HR PRN for SHORTNESS OF BREATH, 

(Reported)


Tizanidine Hcl (Tizanidine Hcl), 1 TAB PO TID PRN for MUSCLE SPASM, (Reported)


Tramadol Hcl (Tramadol Hcl), 50 MG PO PRN PRN for PAIN, (Reported)





Durable Medical Equipment


Lancets (Accu-Chek), 1 EACH MC Q6, (DME)





Objective


Vitals and I/O





Vital Sign - Last 24 Hours








 21





 15:30 15:30 15:56 15:59


 


Temp  99.1  


 


Pulse  83 83 


 


Resp  18 18 18


 


B/P (MAP)  159/79 (105)  


 


Pulse Ox   99 99


 


O2 Delivery Nasal Cannula Nasal Canula Nasal Cannula 


 


O2 Flow Rate 2.00 2.00 3.00 


 


FiO2   32 


 


    





 21





 20:26 20:30 23:57 00:04


 


Temp 98.8   98.7


 


Pulse 88 89  73


 


Resp 20 18  18


 


B/P (MAP) 154/86 (108)   154/71 (98)


 


Pulse Ox  97  


 


O2 Delivery  Nasal Cannula Nasal Cannula 


 


O2 Flow Rate  2.00 2.00 


 


FiO2  28  


 


    





 21 





 00:30 04:15 08:45 


 


Temp  98.6 98.5 


 


Pulse 85 75 107 


 


Resp 18 18 19 


 


B/P (MAP)  150/65 (93) 186/90 (122) 


 


Pulse Ox 98  98 


 


O2 Delivery Bi-pap   


 


O2 Flow Rate 2.50   


 


FiO2 30   














Intake and Output 


 


 21





 06:59


 


Output Total 700 ml


 


Balance -700 ml








General:  Alert, Oriented X3, Cooperative, No acute distress


HEENT:  Atraumatic, PERRLA, EOMI


Neck:  Supple, No JVD


Lungs:  Clear to auscultation, Normal air movement


Heart:  Regular rate, Normal S1, Normal S2


Abdomen:  Normal bowel sounds, Soft, Other (L flank tenderness noted)


Extremities:  No clubbing, No cyanosis


Skin:  No rashes


Neuro:  Normal speech, Sensation intact, Cranial nerves 3-12 NL


Psych/Mental Status:  Mental status NL, Mood NL


All Results(Lab/Rad)





Laboratory Tests








Test


 21


15:35 21


15:52 21


19:41


 


White Blood Count 11.0 10^3/uL   


 


Red Blood Count 3.88 10^6/uL   


 


Hemoglobin 11.7 g/dL   


 


Hematocrit 35.8 %   


 


Mean Corpuscular Volume 92.3 fL   


 


Mean Corpuscular Hemoglobin 30.2 pg   


 


Mean Corpuscular Hemoglobin


Concent 32.7 g/dL 


 


 





 


Red Cell Distribution Width 13.7 %   


 


Platelet Count 207 10^3/uL   


 


Mean Platelet Volume 9.3 fL   


 


Neutrophils (%) (Auto) 76.8 %   


 


Lymphocytes (%) (Auto) 15.2 %   


 


Monocytes (%) (Auto) 5.9 %   


 


Neutrophils # (Auto) 8.5 10^3/uL   


 


Lymphocytes # (Auto) 1.68 10^3/uL1   


 


Monocytes # (Auto) 0.7 10^3/uL   


 


Absolute Immature Granulocyte


(auto 0.03 10^3 u/L 


 


 





 


Absolute Eosinophils (auto) 0.2 10^3/uL   


 


Immature Granulocytes % 0.30 %   


 


Eosinophils % 1.5 %   


 


Basophils % 0.3 %   


 


Basophils # 0.0 10^3/uL   


 


Erythrocyte Sedimentation Rate 13 mm/hr   


 


Prothrombin Time 11.6 SEC   


 


Prothrombin Time INR


(Non-Therap) 1.1 


 


 





 


Sodium Level 140 mmol/L   


 


Potassium Level 3.2 mmol/L   


 


Chloride Level 95.0 mmol/L   


 


Carbon Dioxide Level 34.3 mmol/L   


 


Anion Gap 13.9   


 


Blood Urea Nitrogen 17 mg/dL   


 


Creatinine 1.50 mg/dL   


 


Estimated GFR (


American) 43.2 


 


 





 


Est GFR (CKD-EPI)(Non-Afr


American) 35.7 


 


 





 


BUN/Creatinine Ratio 11.0   


 


Glucose Level 182 mg/dL   


 


Calcium Level 9.9 mg/dL   


 


Total Bilirubin 0.3 mg/dL   


 


Aspartate Amino Transf


(AST/SGOT) 15 U/L 


 


 





 


Alanine Aminotransferase


(ALT/SGPT) 23 U/L 


 


 





 


Alkaline Phosphatase 43 U/L   


 


C-Reactive Protein 0.68 mg/dL   


 


Total Protein 7.7 g/dL   


 


Albumin 4.2 g/dL   


 


Globulin 3.5   


 


Albumin/Globulin Ratio 1.200   


 


Procalcitonin < 0.05 ng/mL   


 


Bedside Glucose  156  


 


Urine Collection Type   UNKNOWN 


 


Urine Color   RED 


 


Urine Appearance   CLOUDY 


 


Urine Bilirubin   SMALL MG/DL 


 


Urine Ictotest   NEGATIVE 


 


Urine Ketones   NEGATIVE 


 


Urine Specific Gravity   1.015 


 


Urine pH   5.5 


 


Urine Protein   100 mg/dL 


 


Urine Urobilinogen   NORMAL 


 


Urine Nitrate   NEGATIVE 


 


Urine Leukocyte Esterase   NEGATIVE 


 


Urine Blood   LARGE 


 


Urine RBC   TNTC RBC/HPF 


 


Urine WBC   0-2 WBC/HPF 


 


Urine Squamous Epithelial


Cells 


 


 FEW #/HPF 





 


Urine Bacteria   RARE 


 


Urine Glucose   NORMAL 


 


Stool Occult Blood Sample #2   Pending 


 


Stool Occult Blood Sample #3   Pending 


 


Bedside Stool Occult Blood   NEGATIVE 


 


Stool Lactoferrin (LAB)   NEGATIVE 


 


Clostridium difficile Screen   NEGATIVE 


 


Clostridium Difficile Toxin A


& B 


 


 NEGATIVE 











Current Medications








 Medications


  (Trade)  Dose


 Ordered  Sig/Tesfaye


 Route


 PRN Reason  Start Time


 Stop Time Status Last Admin


Dose Admin


 


 Sodium Chloride  1,000 ml @ 


 1,000 mls/hr  Q1H ONCE


 IV


   21 15:30


 21 16:29 DC 21 15:30





 


 Sodium Chloride  1,000 ml @ 


 1,000 mls/hr  Q1H ONCE


 IV


   21 15:30


 21 16:29 DC 21 15:30





 


 Morphine Sulfate


  (Morphine


 Sulfate)  4 mg  Q4H  PRN


 IV


 PAIN 7 - 10  21 15:30


 21 15:29  21 06:25





 


 Ondansetron HCl


  (Zofran)  4 mg  Q4H  PRN


 IV


 NAUSEA / VOMITING  21 15:30


 21 15:29   





 


 Albuterol Sulfate


  (Ventolin Hfa)  2 inh  Q6HR  PRN


 IH


 SHORTNESS OF BREATH  21 09:30


 21 09:29 UNV  





 


 Allopurinol


  (Zyloprim)  300 mg  DAILY


 PO


   21 09:00


 21 08:59 UNV  





 


 Amlodipine


 Besylate


  (Norvasc)  5 mg  DAILY


 PO


   21 09:30


 21 09:29 UNV  





 


 Clonidine


  (Catapres)  0.2 mg  HS


 PO


   21 21:00


 21 20:59 UNV  





 


 Fenofibrate


  (Triglide)  160 mg  DAILY24


 PO


   21 09:30


 21 09:29 UNV  





 


 Gabapentin


  (Neurontin)  600 mg  TID


 PO


   21 15:00


 21 14:59 UNV  





 


 Losartan Potassium


  (Cozaar)  100 mg  DAILY


 PO


   21 09:30


 21 09:29 UNV  





 


 Oxcarbazepine


  (Trileptal)  300 mg  DAILY


 PO


   21 09:00


 21 08:59 UNV  





 


 Pantoprazole


 Sodium


  (Protonix)  40 mg  DAILY


 PO


   21 09:00


 21 08:59 UNV  





 


 Rosuvastatin


 Calcium


  (Crestor)  10 mg  DAILY


 PO


   21 09:00


 21 08:59 UNV  





 


 Tiotropium Bromide


  (Spiriva)  1


 inhalation  DAILY


 IH


   21 09:00


 21 08:59 UNV  














Course


Sepsis Screening Results: Posi:  


POSITIVE


Sepsis Qualifier/Stage:  SEPSIS RISK


Duration or Total Time Spent w:  45 min


Vitals & review Data





Vital Sign - Last 24 Hours








 21





 15:30 15:30 15:56 15:59


 


Temp  99.1  


 


Pulse  83 83 


 


Resp  18 18 18


 


B/P (MAP)  159/79 (105)  


 


Pulse Ox   99 99


 


O2 Delivery Nasal Cannula Nasal Canula Nasal Cannula 


 


O2 Flow Rate 2.00 2.00 3.00 


 


FiO2   32 


 


    





 21





 20:26 20:30 23:57 00:04


 


Temp 98.8   98.7


 


Pulse 88 89  73


 


Resp 20 18  18


 


B/P (MAP) 154/86 (108)   154/71 (98)


 


Pulse Ox  97  


 


O2 Delivery  Nasal Cannula Nasal Cannula 


 


O2 Flow Rate  2.00 2.00 


 


FiO2  28  


 


    





 21 





 00:30 04:15 08:45 


 


Temp  98.6 98.5 


 


Pulse 85 75 107 


 


Resp 18 18 19 


 


B/P (MAP)  150/65 (93) 186/90 (122) 


 


Pulse Ox 98  98 


 


O2 Delivery Bi-pap   


 


O2 Flow Rate 2.50   


 


FiO2 30   














Intake and Output 


 


 21





 06:59


 


Output Total 700 ml


 


Balance -700 ml








Laboratory Tests








Test


 21


15:35 21


15:52 21


19:41


 


White Blood Count 11.0 10^3/uL   


 


Red Blood Count 3.88 10^6/uL   


 


Hemoglobin 11.7 g/dL   


 


Hematocrit 35.8 %   


 


Mean Corpuscular Volume 92.3 fL   


 


Mean Corpuscular Hemoglobin 30.2 pg   


 


Mean Corpuscular Hemoglobin


Concent 32.7 g/dL 


 


 





 


Red Cell Distribution Width 13.7 %   


 


Platelet Count 207 10^3/uL   


 


Mean Platelet Volume 9.3 fL   


 


Neutrophils (%) (Auto) 76.8 %   


 


Lymphocytes (%) (Auto) 15.2 %   


 


Monocytes (%) (Auto) 5.9 %   


 


Neutrophils # (Auto) 8.5 10^3/uL   


 


Lymphocytes # (Auto) 1.68 10^3/uL1   


 


Monocytes # (Auto) 0.7 10^3/uL   


 


Absolute Immature Granulocyte


(auto 0.03 10^3 u/L 


 


 





 


Absolute Eosinophils (auto) 0.2 10^3/uL   


 


Immature Granulocytes % 0.30 %   


 


Eosinophils % 1.5 %   


 


Basophils % 0.3 %   


 


Basophils # 0.0 10^3/uL   


 


Erythrocyte Sedimentation Rate 13 mm/hr   


 


Prothrombin Time 11.6 SEC   


 


Prothrombin Time INR


(Non-Therap) 1.1 


 


 





 


Sodium Level 140 mmol/L   


 


Potassium Level 3.2 mmol/L   


 


Chloride Level 95.0 mmol/L   


 


Carbon Dioxide Level 34.3 mmol/L   


 


Anion Gap 13.9   


 


Blood Urea Nitrogen 17 mg/dL   


 


Creatinine 1.50 mg/dL   


 


Estimated GFR (


American) 43.2 


 


 





 


Est GFR (CKD-EPI)(Non-Afr


American) 35.7 


 


 





 


BUN/Creatinine Ratio 11.0   


 


Glucose Level 182 mg/dL   


 


Calcium Level 9.9 mg/dL   


 


Total Bilirubin 0.3 mg/dL   


 


Aspartate Amino Transf


(AST/SGOT) 15 U/L 


 


 





 


Alanine Aminotransferase


(ALT/SGPT) 23 U/L 


 


 





 


Alkaline Phosphatase 43 U/L   


 


C-Reactive Protein 0.68 mg/dL   


 


Total Protein 7.7 g/dL   


 


Albumin 4.2 g/dL   


 


Globulin 3.5   


 


Albumin/Globulin Ratio 1.200   


 


Procalcitonin < 0.05 ng/mL   


 


Bedside Glucose  156  


 


Urine Collection Type   UNKNOWN 


 


Urine Color   RED 


 


Urine Appearance   CLOUDY 


 


Urine Bilirubin   SMALL MG/DL 


 


Urine Ictotest   NEGATIVE 


 


Urine Ketones   NEGATIVE 


 


Urine Specific Gravity   1.015 


 


Urine pH   5.5 


 


Urine Protein   100 mg/dL 


 


Urine Urobilinogen   NORMAL 


 


Urine Nitrate   NEGATIVE 


 


Urine Leukocyte Esterase   NEGATIVE 


 


Urine Blood   LARGE 


 


Urine RBC   TNTC RBC/HPF 


 


Urine WBC   0-2 WBC/HPF 


 


Urine Squamous Epithelial


Cells 


 


 FEW #/HPF 





 


Urine Bacteria   RARE 


 


Urine Glucose   NORMAL 


 


Bedside Stool Occult Blood   NEGATIVE 


 


Stool Lactoferrin (LAB)   NEGATIVE 


 


Clostridium difficile Screen   NEGATIVE 


 


Clostridium Difficile Toxin A


& B 


 


 NEGATIVE 











                               Current Medications








 Medications


  (Trade)  Dose


 Ordered  Sig/Tesfaye


 PRN Reason  Start Time


 Stop Time Status Last Admin


 


 Albuterol Sulfate


  (Ventolin Hfa)  2 inh  Q6HR  PRN


 SHORTNESS OF BREATH  21 09:30


 21 09:29 UNV  





 


 Allopurinol


  (Zyloprim)  300 mg  DAILY


   21 09:00


 21 08:59 UNV  





 


 Amlodipine


 Besylate


  (Norvasc)  5 mg  DAILY


   21 09:30


 21 09:29 UNV  





 


 Clonidine


  (Catapres)  0.2 mg  HS


   21 21:00


 21 20:59 UNV  





 


 Fenofibrate


  (Triglide)  160 mg  DAILY24


   21 09:30


 21 09:29 UNV  





 


 Gabapentin


  (Neurontin)  600 mg  TID


   21 15:00


 21 14:59 UNV  





 


 Losartan Potassium


  (Cozaar)  100 mg  DAILY


   21 09:30


 21 09:29 UNV  





 


 Morphine Sulfate


  (Morphine


 Sulfate)  4 mg  Q4H  PRN


 PAIN 7 - 10  21 15:30


 21 15:29  21 06:25





 


 Ondansetron HCl


  (Zofran)  4 mg  Q4H  PRN


 NAUSEA / VOMITING  21 15:30


 21 15:29   





 


 Oxcarbazepine


  (Trileptal)  300 mg  DAILY


   21 09:00


 21 08:59 UNV  





 


 Pantoprazole


 Sodium


  (Protonix)  40 mg  DAILY


   21 09:00


 21 08:59 UNV  





 


 Rosuvastatin


 Calcium


  (Crestor)  10 mg  DAILY


   21 09:00


 21 08:59 UNV  





 


 Tiotropium Bromide


  (Spiriva)  1


 inhalation  DAILY


   21 09:00


 21 08:59 UNV  











Sepsis Infection Criteria Pres:  None


LEVEL 1 SEPSIS INFECTION CRITE:  Abdominal Pain


LEVEL 2-SIRS (LIST ALL THAT AP:  None/Not assessed


Cardiovascular Evidence:  Not Assessed or None


Hematologic Evidence:  None/Not assessed


Hepatic Evidence:  None/Not assessed


Metabolic Evidence:  None/Not assessed


Neurological Evidence:  None/Not assessed


Respiratory Evidence:  Need for O2 to keep>90%, O2 SAT<90room air


Renal Evidence:  None/Not assessed


O2 Sat by Pulse Oximetry:  98


Respiratory End-tidal CO2:  97


Oxygen Flow Rate:  2.50





Assessment/Plan


59 yo female with LLQ pain and obstructed L urteral stone with hydronephrosis, 

hematochezia, CML, HTN, Type 2 DM





- urology consulted; will go for cysto with possible stent placement


- check stool for blood


- pain control


- IV cipro started for her urinary issues


- follow BPs and sugars on her home meds











MARISABEL CONTEH MD               2021 09:27

## 2021-01-07 NOTE — CNH
DATE OF CONSULTATION:  2021



PRIMARY PHYSICIAN:  Dr. Blackmon. 



CONSULTING PHYSICIAN:  Dr. Walker.



CHIEF COMPLAINT:  Lower left quadrant pain, nausea, vomiting, hematochezia,

dehydration, history of renal calculi, obstructive uropathy.



HISTORY OF PRESENT ILLNESS:  The patient is a 60-year-old white female with

underlying history of chronic myeloid leukemia with multisystem diseases with

prior glomerulonephritis and chronic renal failure, hypertension, hypertensive

heart disease with chronic diastolic heart failure, morbid obesity, obstructive

sleep apnea manifestations on CPAP and recurrent pneumonias and prior pulmonary

hemorrhage and COPD and chronic lung disease oxygen dependent,  chronic pain

syndrome, pulmonary hypertension, dyslipidemia, hypothyroidism, obesity, came in

with a history of known renal calculi with the above symptoms and she was found

to have left hydronephrosis with ureteral calculi causing obstructive uropathy

and at the present time, cardiac consultation [] for an evaluation.  She is

doing well from a cardiopulmonary standpoint.



ALLERGIES:  FENTANYL, FLU VACCINE, PENICILLIN, KEFLEX, MANDOL, PYRIDIUM,

ASPIRIN, CODEINE, IODINE, SULFA.



MEDICATIONS:  She has been on Zofran 4 mg q.8 hours, allopurinol 300 mg once a

day, Ultram 100 mg 4 times a day on p.r.n. basis, fenofibrate 160 mg once a day.

 She was on Adderall 30 mg daily, but I do not think she is taking it at the

present time, amlodipine 5 mg once a day, Levothroid 150 mcg once a day, Xopenex

inhaler 2 puffs daily, Asmanex 2 puffs daily, metformin 500 mg twice a day,

losartan 100 mg once a day, Lasix 60 mg daily and sometimes takes 80 mg daily,

Sprycel 100 mg daily for chronic myeloid leukemia, vitamin D3 10,000 units 3

times a week, oxcarbazepine 300 mg daily, Coreg 25 mg twice a day, Protonix 40

mg once a day, Spiriva 18 mcg handheld nebulizer once a day, clonidine 0.2 mg at

bedtime and Crestor 10 mg once a day, Linzess 290 mcg capsule daily for

irritable colon syndrome and gabapentin 300 mg 2 capsules 3 times a day,

tizanidine 4 mg 3 times a day on p.r.n. basis, and Cymbalta 60 mg daily.



PAST MEDICAL HISTORY:  Multiple chronic medical problems already narrated in my

history of present illness.  She has had recurrent hospitalizations from

pneumonias with underlying immune compromised status with leukemia.  She has

been followed by hematologist.  See my office note for the details.



FAMILY HISTORY:  Mother  of pulmonary fibrosis.  Brother has CAD,

hypertension in the family.



PAST SURGICAL HISTORY:  Hysterectomy, benign breast biopsies, 2 C-sections,

prior ventral hernia operation, cholecystectomy, recurrent kidney stones and has

had lithotripsy.



REVIEW OF SYSTEMS:  Revealed fatigue, weakness, shortness of breath and low

oxygen at times and pain in the left lower quadrant, swelling on the left flank.



PHYSICAL EXAMINATION:

GENERAL:  She was alert, awake, oriented, 170 cm, 113 kg, and BMI 39.4.

VITAL SIGNS:  Showing 97 temperature, pulse is 90, respirations 20, 152/80 blood

pressure, 97 saturation on 3 liters nasal cannula.

HEENT:  Unremarkable.

NECK:  No JVD, no carotid bruit, significant pallor noted.

CHEST:  Thick chest wall.

LUNGS:  Poor air entry bilaterally.

HEART:  Sounds S1, S2 normal.

ABDOMEN:  Truncal obesity, pain in the left flank [] bulging of the left side of

the belly was noted.  Distal pulses poorly felt.  Mild dependent edema.

NEUROLOGIC:  No focal neuro deficit is documented.



Lab data showed 11,000 white count and 11.7 hemoglobin.  Chemistry 1.5

creatinine, 17 BUN, 3.2 potassium, 156 glucose, procalcitonin was less than

0.05.  INR 1.1.  Urine; numerous rbc's and wbc's were noted.  Stool was

unremarkable.  C. difficile was negative.  Her COVID status in the past has been

negative.  Urine cultures showed no growth and her abdominal CT showed 3-mm

stone in left ureter at the level of L3, left hydroureter, hydronephrosis,

bilateral renal cortical calcifications and scarring, nonobstructing 2-mm stone

in the left renal collecting system and left small pleural effusion and

atherosclerosis.  Chest x-ray was showing borderline cardiomegaly, otherwise

unremarkable.  Her EKGs in the office have been essentially unremarkable.  She

has had a normal myocardial perfusion scan in the past.



IMPRESSION:  Obstructive uropathy, hypertension, hypertensive heart disease,

chronic diastolic heart failure, obesity, chronic myeloid leukemia, obstructive

sleep apnea syndrome on BiPAP, underlying chronic lung disease oxygen dependent.



PLAN:  At this time, the patient seems to be stable, wanted to go surgery or

undergo stent placement by Dr. Hansen for her obstructive uropathy.



Thank you very much for this consultation.





______________________________

Patricia Walker MD



DR:  TATE/maria eugenia  JOB# [id number]  [id number]

DD:  2021 16:03  DT:  2021 17:42

## 2021-01-07 NOTE — DIREP
PROCEDURE:XRAY FLUOROSCOPY

 

COMPARISON:None.

 

INDICATIONS:CYSTO W RETROGRADE

 

TECHNIQUE:Intraoperative fluoroscopy

 

FINDINGS:

Intraoperative fluoroscopy for retrograde evaluation of the left ureter.  There 

appears to be a stone in the left intrarenal collecting system.  Mild 

distention of the collecting system at time of the evaluation.  Placement of a 

left ureteral stent.  The right side was not evaluated.

Fluoro time 73.2 seconds

Images:  9

Contrast:  10 cc

 

CONCLUSION:

Intraoperative fluoroscopy for placement of a left ureteral stent.

 

 

 

Dictated by: Hetal Howell MD on 01/07/2021 at 03:05 PM     

Electronically Signed By: Hetal Howell MD on 01/07/2021 at 03:07 PM

## 2021-01-07 NOTE — OPH
DATE OF SURGERY:  01/07/2021



PREOPERATIVE DIAGNOSIS:  Calculus in the left proximal ureter.



FINAL DIAGNOSIS:  Calculus in the left proximal ureter.



PROCEDURES:  Cystoscopy, left retrograde, stone manipulation and insertion of

double-J left ureteral stent.



DESCRIPTION OF PROCEDURE:  The patient was brought to the cystoscopy room, was

put in supine position on the cystoscopy table.  After the patient was given IV

sedation, the patient was placed in the lithotomy position.  The genitalia was

then prepped and draped aseptically in the usual manner.  A 23-[place] cystoscope

was first inserted into the urethra up to the bladder.  With the use of the

right angle lens, the bladder was visualized.  There was no tumor, no calculi,

and no ulcerations seen.  Both ureteral orifices were normal.  Initial left

retrograde was done by inserting a 7-[place] ureteral catheter in the left

ureteral orifice, injected with a dye, which shows the stone in the left

proximal ureter with hydronephrosis.  After this was confirmed, a Glidewire was

inserted through the left ureter and a double-J left ureteral stent was then

inserted from the left ureteral orifice up to the left renal pelvis.  The stone

appears to have moved up to the left renal pelvis during the manipulation and an

insertion of the stent.  After this was done, the Glidewire was removed.  The

cystoscope was reinserted to the bladder and the bladder was emptied with water.

 Procedure was terminated.  The patient was awakened, was transferred to the

recovery room in stable condition.





______________________________

Shai Hansen MD



DR:  PERCY/maria eugenia  JOB# [id number]  [id number]

DD:  01/07/2021 12:54  DT:  01/07/2021 13:09

## 2021-01-07 NOTE — NUR
HEMATURIA

During report, this RN was notified that the pt has not voided since her return from the OR 
at approximately 1330. Pt received a 500ml bolus of NS and a bladder scan was performed. 
Approx. 175mls noted on bladder scan. RN informed if pt had not voided by 2100 to straight 
cath her. At approximately 2050, Pt reported to Rn that she had voided. Notified Dr. Blackmon 
at this time due to pt's recent urination of bright red/bloody urine. Urine output at this 
time is 100mls. Received orders to start pt on 100mg urispas x1 dose now then continue with 
100mg TID. Will continue to monitor urinary output and pt condition. Will notify provider of 
any changes.

## 2021-01-07 NOTE — DIREP
PROCEDURE:CT ABD/PELVIS W/O

 

TECHNIQUE:The patient drank oral contrast material.  Axial cuts were obtained 

through the abdomen and pelvis without IV contrast.  The images were viewed at 

lung and soft tissue settings.  Sagittal and coronal reconstructions are 

provided.   

 

COMPARISON:[facility], CT, CT ABD/PELVIS W/O, 10/18/2020, 01:35 

AM.

 

INDICATIONS:LLQ pain

FINDINGS:

LOWER CHEST:The lung bases are clear.  Previously seen small left pleural 

effusion has nearly completely resolved.  There is residual pleural thickening.

LIVER:Normal.

BILIARY:Previous cholecystectomy.

PANCREAS:Normal.

SPLEEN:Normal.

URINARY TRACT:There is a 3 mm stone in the proximal left ureter at the level 

of L3 series 34146, image 41 and series 2, image 46 causing mild left-sided 

hydroureter and hydronephrosis.  Bilateral cortical scarring with cortical 

calcifications.  Small nonobstructing 2 mm stone in the left renal collecting 

system series 2, image 43 unchanged.  No stones in the right renal collecting 

system.

ADRENALS:Normal.

AORTA/VASCULAR:Extensive arterial calcifications.

RETROPERITONEUM:Normal.

BOWEL/MESENTERY:Scattered diverticula in the descending and sigmoid colon with 

no evidence of diverticulitis.  Appendix not clearly identified.

ABDOMINAL WALL:Normal.

PELVIS:Previous hysterectomy.

BONES:Disc narrowing vacuum changes L5-S1.

OTHER:Normal.

 

CONCLUSION:

 

1.  3 mm stone proximal left ureter at the level of L3 causing left-sided 

hydroureter and hydronephrosis.

 

2.  Bilateral renal cortical calcifications and scarring.  There is a 

nonobstructing 2 mm stone in the left renal collecting system.

 

3.  Previously seen small left pleural effusion has nearly completely resolved.

 

4.  Previous cholecystectomy and hysterectomy.

 

5.  Atherosclerosis.

 

 

 

Dictated by: Caio Benson M.D. on 01/07/2021 at 07:01 AM     

Electronically Signed By: Caio Benson M.D. on 01/07/2021 at 07:09 AM

## 2021-01-08 VITALS — DIASTOLIC BLOOD PRESSURE: 70 MMHG | SYSTOLIC BLOOD PRESSURE: 129 MMHG

## 2021-01-08 VITALS — DIASTOLIC BLOOD PRESSURE: 90 MMHG | SYSTOLIC BLOOD PRESSURE: 164 MMHG

## 2021-01-08 VITALS — SYSTOLIC BLOOD PRESSURE: 129 MMHG | DIASTOLIC BLOOD PRESSURE: 70 MMHG

## 2021-01-08 VITALS — SYSTOLIC BLOOD PRESSURE: 128 MMHG | DIASTOLIC BLOOD PRESSURE: 65 MMHG

## 2021-01-08 VITALS — SYSTOLIC BLOOD PRESSURE: 126 MMHG | DIASTOLIC BLOOD PRESSURE: 69 MMHG

## 2021-01-08 VITALS — SYSTOLIC BLOOD PRESSURE: 157 MMHG | DIASTOLIC BLOOD PRESSURE: 77 MMHG

## 2021-01-08 VITALS — SYSTOLIC BLOOD PRESSURE: 164 MMHG | DIASTOLIC BLOOD PRESSURE: 88 MMHG

## 2021-01-08 VITALS — DIASTOLIC BLOOD PRESSURE: 91 MMHG | SYSTOLIC BLOOD PRESSURE: 161 MMHG

## 2021-01-08 LAB
BASOPHILS # BLD AUTO: 0 10^3/UL (ref 0–0.1)
BASOPHILS NFR BLD AUTO: 0.2 % (ref 0–0.2)
CALCIUM SERPL-MCNC: 8.3 MG/DL (ref 8.4–10.5)
CO2 BLDA-SCNC: 31 MMOL/L (ref 20–32)
EOSINOPHIL # BLD AUTO: 0.2 10^3/UL (ref 0–0.2)
EOSINOPHIL NFR BLD AUTO: 3.5 % (ref 0–5)
ERYTHROCYTE [DISTWIDTH] IN BLOOD BY AUTOMATED COUNT: 13.7 % (ref 11.5–14.5)
GLUCOSE PRE 100 G GLC PO SERPL-MCNC: 185 MG/DL (ref 70–110)
LYMPHOCYTES # BLD AUTO: 1.3 10^3/UL1 (ref 1–4.8)
LYMPHOCYTES NFR BLD AUTO: 21.7 % (ref 24–44)
MCH RBC QN AUTO: 30.6 PG (ref 26–34)
MONOCYTES # BLD AUTO: 0.5 10^3/UL (ref 0.3–0.8)
MONOCYTES NFR BLD AUTO: 8.9 % (ref 5–12)
NEUTROPHILS # BLD AUTO: 3.9 10^3/UL (ref 1.8–7.7)
NEUTROPHILS NFR BLD AUTO: 65.4 % (ref 41–85)
PLATELET # BLD AUTO: 120 10^3/UL (ref 150–400)

## 2021-01-08 RX ADMIN — MORPHINE SULFATE PRN MG: 4 INJECTION, SOLUTION INTRAMUSCULAR; INTRAVENOUS at 18:37

## 2021-01-08 RX ADMIN — GABAPENTIN SCH MG: 300 CAPSULE ORAL at 08:25

## 2021-01-08 RX ADMIN — MORPHINE SULFATE PRN MG: 4 INJECTION, SOLUTION INTRAMUSCULAR; INTRAVENOUS at 13:58

## 2021-01-08 RX ADMIN — CIPROFLOXACIN SCH MLS/HR: 2 INJECTION INTRAVENOUS at 08:42

## 2021-01-08 RX ADMIN — AMLODIPINE BESYLATE SCH MG: 5 TABLET ORAL at 08:26

## 2021-01-08 RX ADMIN — CARVEDILOL SCH MG: 12.5 TABLET, FILM COATED ORAL at 08:26

## 2021-01-08 RX ADMIN — FLAVOXATE HYDROCHLORIDE SCH MG: 100 TABLET, FILM COATED ORAL at 08:52

## 2021-01-08 RX ADMIN — MORPHINE SULFATE PRN MG: 4 INJECTION, SOLUTION INTRAMUSCULAR; INTRAVENOUS at 03:31

## 2021-01-08 RX ADMIN — IPRATROPIUM BROMIDE SCH MG: 500 SOLUTION RESPIRATORY (INHALATION) at 03:00

## 2021-01-08 RX ADMIN — IPRATROPIUM BROMIDE SCH MG: 500 SOLUTION RESPIRATORY (INHALATION) at 08:14

## 2021-01-08 RX ADMIN — LOSARTAN POTASSIUM SCH MG: 50 TABLET, FILM COATED ORAL at 08:28

## 2021-01-08 RX ADMIN — ALPRAZOLAM SCH MG: 0.5 TABLET ORAL at 08:24

## 2021-01-08 RX ADMIN — ALPRAZOLAM SCH MG: 0.5 TABLET ORAL at 13:59

## 2021-01-08 RX ADMIN — Medication SCH MG: at 08:27

## 2021-01-08 RX ADMIN — FLAVOXATE HYDROCHLORIDE SCH MG: 100 TABLET, FILM COATED ORAL at 13:59

## 2021-01-08 RX ADMIN — IPRATROPIUM BROMIDE SCH MG: 500 SOLUTION RESPIRATORY (INHALATION) at 14:29

## 2021-01-08 RX ADMIN — GABAPENTIN SCH MG: 300 CAPSULE ORAL at 13:59

## 2021-01-08 RX ADMIN — FENOFIBRATE SCH MG: 145 TABLET ORAL at 08:27

## 2021-01-08 NOTE — DIET.OP
Nutrition Asmt/Malnutrit 2-17


Actual Date of Review:  2021


Nutritional Screening:  Nutritional Screening (pt reports reduced po and wt 

loss)


Diagnosis:  ureter calculi


Pertinent Medical Hx/Surgical:  


COPD, recurrent pneumonia, hypothyroidism, CML, T2DM


Subjective Information:  


telehealth assessment - Pt with LLQ pain and n/v the last week causing


dehydrations and reduced po intake. s/p ureteral stent placement


yesterday. Diet advanced from clears and full liquids to CC today.


Vomiting resolved.


Current Diet Order/Nutrition S:  1600 roger ADA


Patient /S.O:  Not Indicated


Pertinent Meds





Current Medications








 Medications


  (Trade)  Dose


 Ordered  Sig/Tesfaye


 Route


 PRN Reason  Start Time


 Stop Time Status Last Admin


Dose Admin


 


 Sodium Chloride  1,000 ml @ 


 1,000 mls/hr  Q1H ONCE


 IV


   21 15:30


 21 16:29 DC 21 15:30





 


 Sodium Chloride  1,000 ml @ 


 1,000 mls/hr  Q1H ONCE


 IV


   21 15:30


 21 16:29 DC 21 15:30





 


 Morphine Sulfate


  (Morphine


 Sulfate)  4 mg  Q4H  PRN


 IV


 PAIN 7 - 10  21 15:30


 21 15:29  21 03:31





 


 Ondansetron HCl


  (Zofran)  4 mg  Q4H  PRN


 IV


 NAUSEA / VOMITING  21 15:30


 21 15:29  21 20:34





 


 Albuterol Sulfate


  (Ventolin Hfa)  2 inh  Q6HR  PRN


 IH


 SHORTNESS OF BREATH  21 09:30


 21 09:29   





 


 Allopurinol


  (Zyloprim)  300 mg  DAILY


 PO


   21 09:00


 21 08:59   





 


 Amlodipine


 Besylate


  (Norvasc)  5 mg  DAILY


 PO


   21 09:30


 21 09:29  21 08:26





 


 Clonidine


  (Catapres)  0.2 mg  HS


 PO


   21 21:00


 21 20:59  21 20:16





 


 Fenofibrate


  (Tricor)  145 mg  DAILY


 PO


   21 09:30


 21 09:29  21 08:27





 


 Gabapentin


  (Neurontin)  600 mg  TID


 PO


   21 15:00


 21 14:59  21 08:25





 


 Losartan Potassium


  (Cozaar)  100 mg  DAILY


 PO


   21 09:30


 21 09:29  21 08:28





 


 Oxcarbazepine


  (Trileptal)  300 mg  DAILY


 PO


   21 09:00


 21 08:59   





 


 Pantoprazole


 Sodium


  (Protonix)  40 mg  DAILY


 PO


   21 09:00


 21 08:59   





 


 Rosuvastatin


 Calcium


  (Crestor)  10 mg  DAILY


 PO


   21 09:00


 21 08:59   





 


 Ipratropium


 Bromide


  (Atrovent)  0.5 mg  RTQ6


 IH


   21 15:00


 21 14:59  21 21:45





 


 Alprazolam


  (Xanax)  2 mg  TID


 PO


   21 15:00


 21 14:59  21 08:24





 


 Bupropion HCl


  (Wellbutrin Xl)  300 mg  DAILY24


 PO


   21 09:30


 21 09:29  21 08:27





 


 Carvedilol


  (Coreg)  25 mg  BID


 PO


   21 09:30


 21 09:29  21 08:26





 


 Levothyroxine


 Sodium


  (Synthroid)  150 mcg  ACB


 PO


   21 06:30


 21 06:29  21 06:49





 


 Sodium Chloride


  (Sodium Chloride


 Irr Bottle)  1,000 ml  STK-MED ONCE


 IR


   21 09:45


 21 09:45 DC  





 


 Sodium Chloride


  (NS 3000ml Irr)  3,000 ml  STK-MED ONCE


 IR


   21 09:45


 21 09:45 DC  





 


 Sodium Chloride  1,000 ml @ 


 ud  STK-MED ONCE


 .ROUTE


   21 11:34


 21 11:35 DC  





 


 Lidocaine HCl


  (Lidocaine 2%


 Vial)  500 mg  STK-MED ONCE


 .ROUTE


   21 12:19


 21 12:19 DC  





 


 Hydromorphone HCl


  (Dilaudid)  2 mg  STK-MED ONCE


 .ROUTE


   21 12:19


 21 12:20 DC  





 


 Propofol


  (Diprivan)  200 mg  STK-MED ONCE


 IV


   21 12:19


 21 12:20 DC  





 


 Ondansetron HCl


  (Zofran)  4 mg  STK-MED ONCE


 .ROUTE


   21 12:20


 21 12:20 DC  





 


 Famotidine


  (Pepcid)  20 mg  STK-MED ONCE


 IV


   21 12:20


 21 12:20 DC  





 


 Potassium Chloride  100 ml @ 


 50 mls/hr  OT  ONCE


 IV


   21 12:21


 21 14:20 DC 21 13:58





 


 Hydromorphone HCl


  (Dilaudid)  0.2 mg  Q5M  PRN


 IV


 PAIN 1 - 3  21 13:30


 21 18:09 DC  





 


 Ondansetron HCl


  (Zofran)  4 mg  OT  PRN


 IV


 nausea  21 13:30


 21 15:00 DC  





 


 Albuterol Sulfate


  (Ventolin)  2.5 mg  OT  PRN


 IH


 wheezing  21 13:30


 21 15:00 DC  





 


 Sodium Chloride  500 ml @ 


 1,000 mls/hr  Q30M ONCE


 IV


   21 18:30


 21 20:02 DC 21 18:19





 


 Clonidine


  (Catapres)  0.2 mg  STK-MED ONCE


 .ROUTE


   21 19:44


 21 19:44 DC  





 


 Sodium Chloride  250 ml @ ud  STK-MED ONCE


 IV


   21 19:48


 21 19:48 DC  





 


 Flavoxate HCl


  (Urispas)  100 mg  TID


 PO


   21 21:00


 21 20:59  21 21:00











Pertinent Labs





Laboratory Tests








Test


 21


15:35 21


15:52 21


19:41 21


13:01


 


White Blood Count 11.0 10^3/uL    


 


Red Blood Count 3.88 10^6/uL    


 


Hemoglobin 11.7 g/dL    


 


Hematocrit 35.8 %    


 


Mean Corpuscular Volume 92.3 fL    


 


Mean Corpuscular Hemoglobin 30.2 pg    


 


Mean Corpuscular Hemoglobin


Concent 32.7 g/dL 


 


 


 





 


Red Cell Distribution Width 13.7 %    


 


Platelet Count 207 10^3/uL    


 


Mean Platelet Volume 9.3 fL    


 


Neutrophils (%) (Auto) 76.8 %    


 


Lymphocytes (%) (Auto) 15.2 %    


 


Monocytes (%) (Auto) 5.9 %    


 


Neutrophils # (Auto) 8.5 10^3/uL    


 


Lymphocytes # (Auto) 1.68 10^3/uL1    


 


Monocytes # (Auto) 0.7 10^3/uL    


 


Absolute Immature Granulocyte


(auto 0.03 10^3 u/L 


 


 


 





 


Absolute Eosinophils (auto) 0.2 10^3/uL    


 


Immature Granulocytes % 0.30 %    


 


Eosinophils % 1.5 %    


 


Basophils % 0.3 %    


 


Basophils # 0.0 10^3/uL    


 


Erythrocyte Sedimentation Rate 13 mm/hr    


 


Prothrombin Time 11.6 SEC    


 


Prothrombin Time INR


(Non-Therap) 1.1 


 


 


 





 


Sodium Level 140 mmol/L    


 


Potassium Level 3.2 mmol/L    


 


Chloride Level 95.0 mmol/L    


 


Carbon Dioxide Level 34.3 mmol/L    


 


Anion Gap 13.9    


 


Blood Urea Nitrogen 17 mg/dL    


 


Creatinine 1.50 mg/dL    


 


Estimated GFR (


American) 43.2 


 


 


 





 


Est GFR (CKD-EPI)(Non-Afr


American) 35.7 


 


 


 





 


BUN/Creatinine Ratio 11.0    


 


Glucose Level 182 mg/dL    


 


Calcium Level 9.9 mg/dL    


 


Total Bilirubin 0.3 mg/dL    


 


Aspartate Amino Transf


(AST/SGOT) 15 U/L 


 


 


 





 


Alanine Aminotransferase


(ALT/SGPT) 23 U/L 


 


 


 





 


Alkaline Phosphatase 43 U/L    


 


C-Reactive Protein 0.68 mg/dL    


 


Total Protein 7.7 g/dL    


 


Albumin 4.2 g/dL    


 


Globulin 3.5    


 


Albumin/Globulin Ratio 1.200    


 


Procalcitonin < 0.05 ng/mL    


 


Bedside Glucose  156   123 


 


Urine Collection Type   UNKNOWN  


 


Urine Color   RED  


 


Urine Appearance   CLOUDY  


 


Urine Bilirubin   SMALL MG/DL  


 


Urine Ictotest   NEGATIVE  


 


Urine Ketones   NEGATIVE  


 


Urine Specific Gravity   1.015  


 


Urine pH   5.5  


 


Urine Protein   100 mg/dL  


 


Urine Urobilinogen   NORMAL  


 


Urine Nitrate   NEGATIVE  


 


Urine Leukocyte Esterase   NEGATIVE  


 


Urine Blood   LARGE  


 


Urine RBC   TNTC RBC/HPF  


 


Urine WBC   0-2 WBC/HPF  


 


Urine Squamous Epithelial


Cells 


 


 FEW #/HPF 


 





 


Urine Bacteria   RARE  


 


Urine Glucose   NORMAL  


 


Bedside Stool Occult Blood   NEGATIVE  


 


Stool Lactoferrin (LAB)   NEGATIVE  


 


Clostridium difficile Screen   NEGATIVE  


 


Clostridium Difficile Toxin A


& B 


 


 NEGATIVE 


 





 


Test


 21


04:29 


 


 





 


White Blood Count 6.0 10^3/uL    


 


Red Blood Count 3.24 10^6/uL    


 


Hemoglobin 9.9 g/dL    


 


Hematocrit 30.3 %    


 


Mean Corpuscular Volume 93.5 fL    


 


Mean Corpuscular Hemoglobin 30.6 pg    


 


Mean Corpuscular Hemoglobin


Concent 32.7 g/dL 


 


 


 





 


Red Cell Distribution Width 13.7 %    


 


Platelet Count 120 10^3/uL    


 


Mean Platelet Volume 9.5 fL    


 


Neutrophils (%) (Auto) 65.4 %    


 


Lymphocytes (%) (Auto) 21.7 %    


 


Monocytes (%) (Auto) 8.9 %    


 


Neutrophils # (Auto) 3.9 10^3/uL    


 


Lymphocytes # (Auto) 1.30 10^3/uL1    


 


Monocytes # (Auto) 0.5 10^3/uL    


 


Absolute Immature Granulocyte


(auto 0.02 10^3 u/L 


 


 


 





 


Absolute Eosinophils (auto) 0.2 10^3/uL    


 


Immature Granulocytes % 0.30 %    


 


Eosinophils % 3.5 %    


 


Basophils % 0.2 %    


 


Basophils # 0.0 10^3/uL    


 


Sodium Level 144 mmol/L    


 


Potassium Level 3.2 mmol/L    


 


Chloride Level 106.0 mmol/L    


 


Carbon Dioxide Level 31.0 mmol/L    


 


Glucose Level 185 mg/dL    


 


Blood Urea Nitrogen 6 mg/dL    


 


Creatinine 1.03 mg/dL    


 


Calcium Level 8.3 mg/dL    


 


Anion Gap 10.2    


 


Estimated GFR (


American) 66.6 


 


 


 





 


Est GFR (CKD-EPI)(Non-Afr


American) 55.0 


 


 


 





 


BUN/Creatinine Ratio 5.0    








Height (Feet):  5


Height (Inches):  7


Current Weight:  251


%IBW:  186


Recent Weight Change:  Yes (9# wt loss over last 8 months)


Weight Status:  Obese


GI Symptoms:  Last BM (), [place]


Food Allergies:  Yes (egg)


Calories/Kcals/K-30 kcal/kg of IBW


Kcals Calculated:  7029-7686


Protein g/k-1.5 g/kg IBW


Protein Calculated:  61-91


Fluid: ml:  1 ml/kcal


Nutritional Problem:  Nutr. Problems Present


Problems:  


1. Inadequate oral intake r/t ureter calculi/abdominal pain AEB n/v and


reduced po intake for 1 week prior and clear liquid/full liquid diet the


last 2 days.


RD Comments:


1. Continue 1600 ro[address] ADA diet. RD to monitor po intake and tolerance.


2. Continue Zofran prn.


3. Monitor BG and correct as appropriate.


4. RD to provide diet education if appropriate based on pt's status.


5. Recommend outpatient nutrition counseling for DM/wt loss





RD to monitor PO intake, diet tolerance, weight, education needs, and care plan.


Expected Outcomes


100% po intake of most meals and snacks the next 3 days.


discharge on consistent carb diet with outpatient nutrition counseling.


Malnutrtion/Nutrition Risk Edu:  No


MD Notificiation Needed?:  No











Afia Mckeon                  2021 08:49

## 2021-01-08 NOTE — PNH
DATE:  01/08/2021



SUBJECTIVE:  The patient is doing much better.  She underwent stent placement in

the left kidney for hydroureter and hydronephrosis and still having some pain

and swelling in the left side of the abdomen, but overall much improved.



OBJECTIVE:

VITAL SIGNS:  Her blood pressure has gone up to 161/95, pulse is 95,

respirations 19, 98 temperature and 93 saturation.

NECK:  No JVD.

LUNGS:  Clear.

HEART:  Sounds normal.  I did add her amlodipine 5 mg daily and she is on

losartan 100 mg once a day, Coreg 25 mg twice a day for her hypertension.



LABORATORY DATA:  Her white count has come down to 11,000 to 6,000; hemoglobin

is 9.9, initially was 11.7.  Her chemistries are showing a potassium of 3.2,

glucose 185.  Calcium was 8.3 and BUN has come down from 17 to 6 and creatinine

from 1.5 to 1.03.  Significant improvement of acute kidney injury following

relief of left hydroureter and hydronephrosis after stent placement for a stone

in the left kidney.



IMPRESSION:  Obstructive uropathy post-stent placement, nephrolithiasis in both

kidneys, hypertension, hypertensive heart disease, chronic diastolic heart

failure, chronic myeloid leukemia, COPD, obstructive sleep apnea syndrome, on

BiPAP machine.



PLAN:  At this time, her home medications reconciled, to continue

chemotherapeutic agent for CML and control of blood pressure and pain control.





______________________________

Patricia Walker MD



DR:  TATE/maria eugenia  JOB# [id number]  [id number]

DD:  01/08/2021 10:21  DT:  01/08/2021 10:49

## 2021-01-09 NOTE — PRM.DC
DISCHARGE SUMMARY


DISCHARGE SUMMARY


DATE OF ADMISSION: January 6, 2021





DATE OF DISCHARGE: January 8, 2021





ADMITTING DIAGNOSES: Lower quadrant pain with nausea vomiting and dehydration 

and recent hematochezia





DISCHARGE DIAGNOSES: Left ureteral stone with obstruction and left 

hydronephrosis, history of CML and left lower quadrant pain





DISCHARGE DISPOSITION: Patient is discharged home





DISCHARGE CONDITION: Improved





HOSPITAL COURSE: 60-year-old female who came into the hospital with left lower 

quadrant pain and having history of hematochezia.  We were initially concerned 

about a possible GI problem and a CAT scan was done that showed a left ureteral 

stone with left hydronephrosis.  I consulted urology and a cystoscopy was done 

with a stent placement.  I did check her stools and that has been negative for 

blood at this time.  Her vital signs have been stable after the cystoscopy.  At 

this time she will be discharged and follow-up with urology next week for 

lithotripsy and stent removal after that.  I am putting her on doxycycline for 

10 days in the meantime for a UTI picture.  And I plan to send her to GI for a 

colonoscopy down the road.





DIET: Resume home diet





ACTIVITY: As tolerated





MEDICATIONS: 1.  Resume home medications


      2.  Doxycycline 100 mg p.o. twice daily x10 days





FOLLOW-UP: 1. Follow-up with Dr. Hansen next week for lithotripsy


      2.  Follow-up with me in 2 weeks time; office staff will make the 

appointment











MARISABEL CONTEH MD               Jan 9, 2021 14:35

## 2021-01-15 ENCOUNTER — HOSPITAL ENCOUNTER (OUTPATIENT)
Dept: HOSPITAL 65 - SDC | Age: 61
Discharge: HOME | End: 2021-01-15
Attending: UROLOGY
Payer: MEDICARE

## 2021-01-15 VITALS — DIASTOLIC BLOOD PRESSURE: 77 MMHG | SYSTOLIC BLOOD PRESSURE: 140 MMHG

## 2021-01-15 VITALS — SYSTOLIC BLOOD PRESSURE: 143 MMHG | DIASTOLIC BLOOD PRESSURE: 74 MMHG

## 2021-01-15 VITALS — DIASTOLIC BLOOD PRESSURE: 78 MMHG | SYSTOLIC BLOOD PRESSURE: 140 MMHG

## 2021-01-15 VITALS — DIASTOLIC BLOOD PRESSURE: 96 MMHG | SYSTOLIC BLOOD PRESSURE: 138 MMHG

## 2021-01-15 VITALS — BODY MASS INDEX: 39.44 KG/M2 | WEIGHT: 251.31 LBS | HEIGHT: 67 IN

## 2021-01-15 VITALS — DIASTOLIC BLOOD PRESSURE: 86 MMHG | SYSTOLIC BLOOD PRESSURE: 136 MMHG

## 2021-01-15 VITALS — SYSTOLIC BLOOD PRESSURE: 137 MMHG | DIASTOLIC BLOOD PRESSURE: 84 MMHG

## 2021-01-15 VITALS — SYSTOLIC BLOOD PRESSURE: 142 MMHG | DIASTOLIC BLOOD PRESSURE: 82 MMHG

## 2021-01-15 DIAGNOSIS — Z91.040: ICD-10-CM

## 2021-01-15 DIAGNOSIS — Z88.0: ICD-10-CM

## 2021-01-15 DIAGNOSIS — M47.816: ICD-10-CM

## 2021-01-15 DIAGNOSIS — Z79.84: ICD-10-CM

## 2021-01-15 DIAGNOSIS — Z88.8: ICD-10-CM

## 2021-01-15 DIAGNOSIS — M79.7: ICD-10-CM

## 2021-01-15 DIAGNOSIS — Z91.018: ICD-10-CM

## 2021-01-15 DIAGNOSIS — E66.01: ICD-10-CM

## 2021-01-15 DIAGNOSIS — N20.0: Primary | ICD-10-CM

## 2021-01-15 DIAGNOSIS — Z87.01: ICD-10-CM

## 2021-01-15 DIAGNOSIS — Z98.890: ICD-10-CM

## 2021-01-15 DIAGNOSIS — G47.33: ICD-10-CM

## 2021-01-15 DIAGNOSIS — Z88.2: ICD-10-CM

## 2021-01-15 DIAGNOSIS — J44.9: ICD-10-CM

## 2021-01-15 DIAGNOSIS — Z88.6: ICD-10-CM

## 2021-01-15 DIAGNOSIS — Z90.710: ICD-10-CM

## 2021-01-15 DIAGNOSIS — E11.9: ICD-10-CM

## 2021-01-15 DIAGNOSIS — Z79.899: ICD-10-CM

## 2021-01-15 PROCEDURE — 50590 FRAGMENTING OF KIDNEY STONE: CPT

## 2021-01-15 PROCEDURE — 74018 RADEX ABDOMEN 1 VIEW: CPT

## 2021-01-15 PROCEDURE — 82948 REAGENT STRIP/BLOOD GLUCOSE: CPT

## 2021-01-15 NOTE — OPH
DATE OF SURGERY:  01/15/2021



DESCRIPTION OF PROCEDURE:  The patient was brought to the lithotripsy room, was

put in supine position in the lithotripsy table.  The patient's preop KUB and

renal ultrasound revealed a stone in the mid pole of the left kidney measuring

5.8 mm in diameter.  The patient also has indwelling left ureteral stent.  After

the patient was given IV sedation and after localization of the stone, we used

an ultrasound and fluoroscopy, a left ESWL was then performed using a Dornier

Compact Delta II lithotripter.  A total of 1500 shockwaves were delivered to the

stones in the left kidney under ultrasound guidance.  After fragmentation of the

stone as noted in the ultrasound, procedure was terminated.  The patient was

awakened, was transferred to the recovery room in stable condition.





______________________________

Shai Hansen MD



DR:  PERCY/maria eugenia  JOB# [id number]  [id number]

DD:  01/15/2021 09:59  DT:  01/15/2021 10:11

## 2021-01-15 NOTE — DIREP
PROCEDURE:XRAY ABDOMEN SINGLE VW

 

COMPARISON:None.

 

INDICATIONS:ESWL

 

FINDINGS:

BOWEL GAS PATTERN:Mild-to-moderate fecal burden within the proximal colon.  

The distal colon appears relatively decompressed.  No dilated loops of small 

bowel are identified.

CALCIFICATIONS:No suspicious calcifications are appreciated.  Left ureteral 

stent is present.

LUNG BASES:Not adequately imaged.

BONES:Degenerative changes of the lower lumbar spine.

OTHER:Surgical clips within the right upper quadrant suggest previous 

cholecystectomy.

 

CONCLUSION:

1.  Nonspecific nonobstructive bowel gas pattern.  Mild-to-moderate fecal 

burden within the proximal colon.

2.  Left ureteral stent is present.  No suspicious calcifications are 

identified.

 

 

Dictated by: Aristeo Bonilla M.D.  On 01/15/2021 at 09:05 AM     

Electronically Signed By: Aristeo Bonilla M.D. on 01/15/2021 at 09:07 AM

## 2021-01-20 ENCOUNTER — HOSPITAL ENCOUNTER (EMERGENCY)
Dept: HOSPITAL 65 - ER | Age: 61
Discharge: HOME | End: 2021-01-20
Payer: MEDICARE

## 2021-01-20 ENCOUNTER — HOSPITAL ENCOUNTER (OUTPATIENT)
Dept: HOSPITAL 65 - SURG | Age: 61
Discharge: HOME | End: 2021-01-20
Attending: UROLOGY
Payer: MEDICARE

## 2021-01-20 VITALS — DIASTOLIC BLOOD PRESSURE: 79 MMHG | SYSTOLIC BLOOD PRESSURE: 142 MMHG

## 2021-01-20 VITALS — SYSTOLIC BLOOD PRESSURE: 143 MMHG | DIASTOLIC BLOOD PRESSURE: 77 MMHG

## 2021-01-20 VITALS — HEIGHT: 62 IN | BODY MASS INDEX: 43.24 KG/M2 | WEIGHT: 235 LBS

## 2021-01-20 VITALS — DIASTOLIC BLOOD PRESSURE: 81 MMHG | SYSTOLIC BLOOD PRESSURE: 148 MMHG

## 2021-01-20 VITALS
SYSTOLIC BLOOD PRESSURE: 143 MMHG | WEIGHT: 251 LBS | DIASTOLIC BLOOD PRESSURE: 70 MMHG | HEIGHT: 67 IN | BODY MASS INDEX: 39.39 KG/M2

## 2021-01-20 VITALS — DIASTOLIC BLOOD PRESSURE: 96 MMHG | SYSTOLIC BLOOD PRESSURE: 171 MMHG

## 2021-01-20 VITALS — DIASTOLIC BLOOD PRESSURE: 82 MMHG | SYSTOLIC BLOOD PRESSURE: 154 MMHG

## 2021-01-20 VITALS — DIASTOLIC BLOOD PRESSURE: 78 MMHG | SYSTOLIC BLOOD PRESSURE: 142 MMHG

## 2021-01-20 VITALS — DIASTOLIC BLOOD PRESSURE: 74 MMHG | SYSTOLIC BLOOD PRESSURE: 152 MMHG

## 2021-01-20 VITALS — SYSTOLIC BLOOD PRESSURE: 143 MMHG | DIASTOLIC BLOOD PRESSURE: 74 MMHG

## 2021-01-20 VITALS — DIASTOLIC BLOOD PRESSURE: 80 MMHG | SYSTOLIC BLOOD PRESSURE: 147 MMHG

## 2021-01-20 DIAGNOSIS — I25.10: ICD-10-CM

## 2021-01-20 DIAGNOSIS — Z79.52: ICD-10-CM

## 2021-01-20 DIAGNOSIS — Z79.84: ICD-10-CM

## 2021-01-20 DIAGNOSIS — E11.9: ICD-10-CM

## 2021-01-20 DIAGNOSIS — N20.0: ICD-10-CM

## 2021-01-20 DIAGNOSIS — E66.01: ICD-10-CM

## 2021-01-20 DIAGNOSIS — Z79.899: ICD-10-CM

## 2021-01-20 DIAGNOSIS — Z88.2: ICD-10-CM

## 2021-01-20 DIAGNOSIS — I11.0: ICD-10-CM

## 2021-01-20 DIAGNOSIS — Z98.890: ICD-10-CM

## 2021-01-20 DIAGNOSIS — M79.7: ICD-10-CM

## 2021-01-20 DIAGNOSIS — Z88.5: ICD-10-CM

## 2021-01-20 DIAGNOSIS — N20.2: ICD-10-CM

## 2021-01-20 DIAGNOSIS — Z90.710: ICD-10-CM

## 2021-01-20 DIAGNOSIS — J44.9: ICD-10-CM

## 2021-01-20 DIAGNOSIS — I50.9: ICD-10-CM

## 2021-01-20 DIAGNOSIS — Z90.49: ICD-10-CM

## 2021-01-20 DIAGNOSIS — Z87.01: ICD-10-CM

## 2021-01-20 DIAGNOSIS — N39.0: Primary | ICD-10-CM

## 2021-01-20 DIAGNOSIS — Z91.018: ICD-10-CM

## 2021-01-20 DIAGNOSIS — Z88.6: ICD-10-CM

## 2021-01-20 DIAGNOSIS — Z88.0: ICD-10-CM

## 2021-01-20 DIAGNOSIS — Z88.8: ICD-10-CM

## 2021-01-20 DIAGNOSIS — Z46.6: Primary | ICD-10-CM

## 2021-01-20 LAB
ALP INTEST CFR SERPL: 42 U/L (ref 50–136)
ALT SERPL-CCNC: 19 U/L (ref 12–78)
APPEARANCE UR: (no result)
AST SERPL-CCNC: 13 U/L (ref 0–35)
BASOPHILS # BLD AUTO: 0 10^3/UL (ref 0–0.1)
BASOPHILS NFR BLD AUTO: 0.4 % (ref 0–0.2)
BILIRUB UR STRIP.AUTO-MCNC: (no result) MG/DL
CALCIUM SERPL-MCNC: 8.9 MG/DL (ref 8.4–10.5)
CO2 BLDA-SCNC: 30.9 MMOL/L (ref 20–32)
COLOR UR: (no result)
EOSINOPHIL # BLD AUTO: 0.3 10^3/UL (ref 0–0.2)
EOSINOPHIL NFR BLD AUTO: 4.1 % (ref 0–5)
ERYTHROCYTE [DISTWIDTH] IN BLOOD BY AUTOMATED COUNT: 13.7 % (ref 11.5–14.5)
GLUCOSE PRE 100 G GLC PO SERPL-MCNC: 140 MG/DL (ref 70–110)
LYMPHOCYTES # BLD AUTO: 1.35 10^3/UL1 (ref 1–4.8)
LYMPHOCYTES NFR BLD AUTO: 16.3 % (ref 24–44)
MCH RBC QN AUTO: 30.2 PG (ref 26–34)
MONOCYTES # BLD AUTO: 0.6 10^3/UL (ref 0.3–0.8)
MONOCYTES NFR BLD AUTO: 7 % (ref 5–12)
NEUTROPHILS # BLD AUTO: 6 10^3/UL (ref 1.8–7.7)
NEUTROPHILS NFR BLD AUTO: 72.1 % (ref 41–85)
PLATELET # BLD AUTO: 173 10^3/UL (ref 150–400)
UROBILINOGEN UR QL STRIP.AUTO: 1

## 2021-01-20 PROCEDURE — 52310 CYSTOSCOPY AND TREATMENT: CPT

## 2021-01-20 PROCEDURE — A4217 STERILE WATER/SALINE, 500 ML: HCPCS

## 2021-01-20 PROCEDURE — 85025 COMPLETE CBC W/AUTO DIFF WBC: CPT

## 2021-01-20 PROCEDURE — 74176 CT ABD & PELVIS W/O CONTRAST: CPT

## 2021-01-20 PROCEDURE — 80053 COMPREHEN METABOLIC PANEL: CPT

## 2021-01-20 PROCEDURE — 87086 URINE CULTURE/COLONY COUNT: CPT

## 2021-01-20 PROCEDURE — 99284 EMERGENCY DEPT VISIT MOD MDM: CPT

## 2021-01-20 PROCEDURE — 96365 THER/PROPH/DIAG IV INF INIT: CPT

## 2021-01-20 PROCEDURE — 96375 TX/PRO/DX INJ NEW DRUG ADDON: CPT

## 2021-01-20 PROCEDURE — 36415 COLL VENOUS BLD VENIPUNCTURE: CPT

## 2021-01-20 PROCEDURE — C1769 GUIDE WIRE: HCPCS

## 2021-01-20 PROCEDURE — 81000 URINALYSIS NONAUTO W/SCOPE: CPT

## 2021-01-20 PROCEDURE — 96376 TX/PRO/DX INJ SAME DRUG ADON: CPT

## 2021-01-20 PROCEDURE — 82948 REAGENT STRIP/BLOOD GLUCOSE: CPT

## 2021-01-20 PROCEDURE — 76000 FLUOROSCOPY <1 HR PHYS/QHP: CPT

## 2021-01-20 NOTE — DIREP
PROCEDURE:XRAY FLUOROSCOPY

 

COMPARISON:[facility], [facility], XRAY FLUOROSCOPY, 01/07/2021, 01:52 

PM.

 

INDICATIONS:STENT REMOVAL, 7 IMAGES, 50.9 SEC FLUORO, 41.71 mGy

 

 

FINDINGS:Left fluoroscopic support ureteric stent removal.  No residual 

calcifications detected

 

CONCLUSION:Left ureteric stent removal, fluoroscopic support. 

 

 

Dictated by: Francine Acuna MD on 01/20/2021 at 11:19 AM     

Electronically Signed By: Francine Acuna MD on 01/20/2021 at 11:21 AM

## 2021-01-20 NOTE — OPH
DATE OF SURGERY:  01/20/2021



PREOPERATIVE DIAGNOSIS:  Left renal calculus, status post extracorporeal shock

wave lithotripsy with an indwelling stent.



FINAL DIAGNOSIS:  Left renal calculus, status post extracorporeal shock wave

lithotripsy with an indwelling stent.



PROCEDURES:  Cystoscopy, removal of left ureteral stent with left ureteroscopy.



DESCRIPTION OF PROCEDURE:  The patient was brought to the cystoscopy room, was

put in supine position on the cystoscopy table.  After the patient was given IV

sedation, the patient was placed in lithotomy position.  Genitalia was then

prepped and draped aseptically in the usual manner.  First, a 23-Romanian

cystoscope was inserted per urethra up to the bladder and the bladder was

visualized and left ureteral stent was removed and replaced with a Glidewire. 

The cystoscope was then removed and then a 7-Romanian semirigid ureteroscope was

inserted through the urethra to the left ureteral orifice through the guidewire

all the way to the left renal pelvis and it revealed no evidence of residual

ureteral stone.  The Glidewire was removed.  The ureteroscope was removed and

then a cystoscope was inserted to the bladder and the bladder was emptied with

water.  After that, the instrument was removed.  The patient was then awakened,

was transferred to the recovery room in stable condition.





______________________________

Shai Hansen MD



DR:  PERCY/maria eugenia  JOB# [id number]  [id number]

DD:  01/20/2021 10:31  DT:  01/20/2021 10:46

## 2021-01-20 NOTE — ER.PDOC
General


Chief Complaint:  Flank Pain


Stated Complaint:  FEMALE 


Time seen by MD:  15:49


Source:  patient


Exam Limitations:  no limitations





History of Present Illness


Initial Comments


Patient present for left flank pain. She reports she had a staghorn calculus on 

thursday that was treated with lithotripsy and stent. The stent was removed by 

Dr Rosenthal this morning. She reports that she has had worsening pain since it 

was removed. Pain is 10/10. It is not improved with tramadol. SHe reports nausea

and vomiting and hematuria. She called Dr Mahoney office and was told to come 

to the ER for pain control. No fever or chills


Allergies:  


Coded Allergies:  


     Penicillins (Verified  Allergy, Severe, unknown, 1/24/17)


   "CONVULSIONS"


     aspirin (Verified  Allergy, Severe, 1/24/17)


   "CONVULSIONS"


     cefamandole nafate (Verified  Allergy, Severe, 1/24/17)


     iodine (Verified  Allergy, Severe, Shortness of Breath, 1/24/17)


     quetiapine (Verified  Allergy, Severe, "I GO CRAZY, OUT OF MY HEAD", 

1/24/17)


     Cephalexin Monohydrate (Verified  Allergy, Intermediate, Hives, 1/24/17)


     egg (Verified  Allergy, Intermediate, Headache, 1/24/17)


     latex (Verified  Allergy, Intermediate, HIVES, 1/24/17)


     hydrocodone bitartrate (Verified  Allergy, Unknown, 1/24/17)


     phenazopyridine HCl (Verified  Allergy, Unknown, 1/24/17)


     codeine (Verified  Adverse Reaction, Mild, 1/24/17)


   "I GO CRAZY"


Uncoded Allergies:  


     SULFA (Allergy, Unknown, 5/28/14)


Home Meds


Active Scripts


[Flavoxate]   No Conflict Check, 100 MG PO TID for BLADDER SPASM, #30


   Prov:DAVY ROSENTHAL MD         1/20/21


Doxycycline Hyclate (DOXYCYCLINE HYCLATE) 100 Mg Capsule, 100 MG PO BID, #20 CAP

 0 Refills


   Prov:MARISABEL CONTEH MD         1/8/21


Oxcarbazepine (OXCARBAZEPINE) 150 Mg Tablet, 300 MG PO DAILY for 30 Days, TAB


   Prov:DWAIN WINKLER MD         1/10/20


Losartan Potassium (COZAAR) 50 Mg Tablet, 100 MG PO DAILY for 30 Days, #30 TAB


   Prov:DWAIN WINKLER MD         1/10/20


Prednisone (PREDNISONE) 20 Mg Tablet, 40 MG PO DAILY24 for 30 Days


   Prov:DWAIN WINKLER MD         12/5/19


Pantoprazole Sodium (PROTONIX) 40 Mg Tablet.dr, 40 MG PO DAILY for 30 Days, #30


   Prov:DWAIN WINKLER MD         12/5/19


Lancets (ACCU-CHEK) 1 Each Each, 1 EACH MC Q6 for 30 Days, #30 EACH


   Prov:DWAIN WINKLER MD         7/12/19


Amlodipine Besylate (NORVASC) 5 Mg Tablet, 5 MG PO DAILY for 30 Days, TABLET


   Prov:DWAIN WINKLER MD         7/12/19


Reported Medications


Carvedilol 25MG (COREG 25MG) 25 Mg Tablet, 1 TAB PO BID, #60 TAB 5 Refills


   12/3/19


Clonidine Hcl (CLONIDINE HCL) 0.2 Mg Tablet, 1 TAB PO HS, #30 TAB 2 Refills


   12/2/19


Tramadol Hcl (TRAMADOL HCL) 50 Mg Tablet, 50 MG PO PRN PRN for PAIN, TABLET


   12/2/19


Metformin Hcl (METFORMIN HCL) 500 Mg Tablet, 1 TAB PO BID, #60 TAB 3 Refills


   12/2/19


Rosuvastatin 10MG (CRESTOR 10MG) 10 Mg Tablet, 1 TAB PO DAILY, #30 TAB 5 Refills


   12/2/19


Duloxetine Hcl (CYMBALTA) 60 Mg Capsule.dr, 60 MG PO DAILY24


   7/10/19


Fenofibrate (FENOFIBRATE) 160 Mg Tablet, 160 MG PO DAILY24, TABLET


   7/10/19


Ondansetron (ZOFRAN ODT) 8 Mg Tab.rapdis, 8 MG PO DAILY24 for N/V, TAB


   8/6/18


Dasatinib (SPRYCEL) 100 Mg Tablet, 90 MG PO DAILY24, TABLET


   8/6/18


Potassium Citrate (POTASSIUM CITRATE) 10 Meq Tablet.er, 10 MEQ PO DAILY24


   8/6/18


Bupropion Hcl (BUPROPION XL) 300 Mg Tab.er.24h, 300 MG PO DAILY24


   8/6/18


Ondansetron (ZOFRAN ODT) 8 Mg Tab.rapdis, 8 MG PO DAILY24 for N/V, TAB


   8/6/18


Dasatinib (SPRYCEL) 80 Mg Tablet, 90 MG PO DAILY24, TABLET


   8/6/18


Albuterol Sulfate (VENTOLIN HFA) 18 Gm Hfa.aer.ad, 90 MCG IH Q6HR PRN for 

SHORTNESS OF BREATH


   8/6/18


Allopurinol (ALLOPURINOL) 300 Mg Tablet, 1 TAB PO DAILY


   8/6/18


Duloxetine Hcl (CYMBALTA) 60 Mg Capsule.dr, 60 MG PO DAILY


   1/24/17


Tiotropium Bromide (SPIRIVA) 18 Mcg Cap.w.dev, 1 CAP IH DAILY


   10/14/16


Furosemide (LASIX) 20 Mg Tablet, 1 TAB PO DAILY


   9/24/15


Tizanidine Hcl (TIZANIDINE HCL) 4 Mg Tablet, 1 TAB PO TID PRN for MUSCLE SPASM


   9/24/15


Gabapentin (NEURONTIN) 300 Mg Capsule, 2 CAP PO TID


   11/21/14


Alprazolam (XANAX) 2 Mg Tablet, 2 MG PO TID, TABLET


   11/17/14


Linaclotide (LINZESS) 290 Mcg Capsule, 290 MCG PO DAILY, CAPSULE


   11/17/14


Cholecalciferol (Vitamin D3) (VITAMIN D) 10,000 Unit Capsule, 30419 UNIT PO 3 

TIMES A WEEK, CAPSULE


   11/17/14


Levothyroxine Sodium (LEVOTHYROXINE SODIUM) 200 Mcg Tablet, 150 MCG PO DAILY, 

TABLET


   6/13/14


Vital Signs





First Vital Signs








  Date Time  Temp Pulse Resp B/P (MAP) Pulse Ox O2 Delivery O2 Flow Rate FiO2


 


1/8/21 10:22  95      


 


1/20/21 15:22 98.0  18     


 


1/20/21 15:22    171/96 (121) 95 Room Air  








Last Vital Signs








  Date Time  Temp Pulse Resp B/P (MAP) Pulse Ox O2 Delivery O2 Flow Rate FiO2


 


1/20/21 15:22 98.0 105 20  95   


 


1/20/21 15:22    171/96 (121)  Room Air  











Past Medical History


Medical History:  cancer, coronary artery disease, cardiac problems, congestive 

heart failure, COPD, diabetes, hypertension, renal disease, other


Surgical History:  cholecystectomy, hysterectomy





Social History


Alcohol Use:  none


Drug Use:  none





Reviewed


Nursing Reviewed:  Vital Signs, Abn. Noted, Nursing Assessment





Constitutional:  no symptoms reported


EENTM:  no symptoms reported


Respiratory:  no symptoms reported


Cardiovascular:  no symptoms reported


Gastrointestinal:  see HPI


Genitourinary:  see HPI


Musculoskeletal:  no symptoms reported


All Other Systems:  Reviewed and Negative





Physical Exam


General Appearance:  Mild Distress, Obese


HEENT:  PERRL/EOMI, Normal ENT Inspection


Neck:  Non-Tender, Supple


Respiratory:  lungs clear, no respiratory distress


Cardiovascular:  Normal Peripheral Pulses, Regular Rate, Rhythm


Gastrointestinal:  Normal Bowel Sounds, Soft (LUQ pain)


Back:  CVA Tenderness (L)


Extremities:  Non-Tender, Normal Inspection


Neurologic/Psychiatric:  No Motor/Sensory Deficits, Alert


Skin:  Normal Color





Results/Orders


Results/Orders





Orders - SHON JUNG MD


Cbc With Auto Diff (1/20/21 15:56)


Comprehensive Metabolic Panel (1/20/21 15:56)


Urinalysis (1/20/21 15:56)


Saline Lock (1/20/21 15:56)


Ct Abd/Pelvis Wo Iv Contrast (1/20/21 15:56)


Morphine Sulfate (Morphine Sulfate) (1/20/21 15:56)


Ketorolac Tromethamine (Toradol) (1/20/21 15:56)


Ondansetron Hcl/Pf (Zofran) (1/20/21 15:56)


Ondansetron Hcl/Pf (Zofran) (1/20/21 16:28)


Ketorolac Tromethamine (Toradol) (1/20/21 16:28)


Morphine Sulfate (Morphine Sulfate) (1/20/21 16:28)


Urine Culture (1/20/21 15:25)


Levofloxacin 750mg/D5w 100ml (Levaquin) (1/20/21 18:00)


Levofloxacin 750mg/D5w 100ml (Levaquin) (1/20/21 17:51)





Vital Signs








  Date Time  Temp Pulse Resp B/P (MAP) Pulse Ox O2 Delivery O2 Flow Rate FiO2


 


1/20/21 15:22 98.0 105 20  95   


 


1/20/21 15:22 98.0 105 18 171/96 (121) 95 Room Air  


 


1/20/21 15:22 98.0 105 18     


 


1/8/21 10:22  95      








Administered Medications








 Medications


  (Trade)  Dose


 Ordered  Sig/Tesfaye


 Route


 PRN Reason  Start Time


 Stop Time Status Last Admin


Dose Admin


 


 Ketorolac


 Tromethamine


  (Toradol)  15 mg  STAT  STAT


 IV


   1/20/21 15:56


 1/20/21 15:59 DC 1/20/21 16:36


15 MG


 


 Levofloxacin/


 Dextrose  150 ml @ 


 100 mls/hr  Q24HRS  ONCE


 IV


   1/20/21 18:00


 1/20/21 19:29  1/20/21 18:11


100 MLS/HR


 


 Morphine Sulfate


  (Morphine


 Sulfate)  4 mg  STAT  STAT


 IV


   1/20/21 15:56


 1/20/21 15:59 DC 1/20/21 16:36


4 MG


 


 Ondansetron HCl


  (Zofran)  4 mg  STAT  STAT


 IV


   1/20/21 15:56


 1/20/21 15:59 DC 1/20/21 16:37


4 MG








                                Laboratory Tests








Test


 1/20/21


15:25 1/20/21


16:09


 


Urine Collection Type VOID   


 


Urine Color RED (YELLOW)  H 


 


Urine Appearance


 CLOUDY (CLEAR)


H 





 


Urine Bilirubin


 MODERATE MG/DL


(NEGATIVE) 





 


Urine Ictotest


 NEGATIVE


(NEGATIVE) 





 


Urine Ketones


 TRACE


(NEGATIVE) 





 


Urine Specific Gravity


 1.025


(1.005-1.035) 





 


Urine pH 5.5 (5.0-6.0)   


 


Urine Protein


 >=300 mg/dL


(NEGATIVE) 





 


Urine Urobilinogen


 1.0 (NEGATIVE)


H 





 


Urine Nitrate


 POSITIVE


(NEGATIVE)  H 





 


Urine Leukocyte Esterase


 25 /uL  TRACE


(NEGATIVE) 





 


Urine Blood


 LARGE


(NEGATIVE) 





 


Urine RBC


 TNTC RBC/HPF


(NONE SEEN)  H 





 


Urine WBC


 TNTC WBC/HPF


(0-2)  H 





 


Urine Squamous Epithelial


Cells FEW #/HPF


(FEW) 





 


Urine Bacteria


 MANY (NONE


SEEN)  H 





 


Urine Glucose


 NORMAL


(NEGATIVE) 





 


White Blood Count


 


 8.3 10^3/uL


(4.5-11.0)


 


Red Blood Count


 


 3.28 10^6/uL


(4.00-5.20)  L


 


Hemoglobin


 


 9.9 g/dL


(12.0-15.0)  L


 


Hematocrit


 


 31.0 %


(36.0-46.0)  L


 


Mean Corpuscular Volume


 


 94.5 fL


()


 


Mean Corpuscular Hemoglobin


 


 30.2 pg


(26-34)


 


Mean Corpuscular Hemoglobin


Concent 


 31.9 g/dL


(33-36.5)  L


 


Red Cell Distribution Width


 


 13.7 %


(11.5-14.5)


 


Platelet Count


 


 173 10^3/uL


(150-400)


 


Mean Platelet Volume


 


 9.5 fL


(7.8-11.0)


 


Neutrophils (%) (Auto)


 


 72.1 %


(41.0-85.0)


 


Lymphocytes (%) (Auto)


 


 16.3 %


(24.0-44.0)  L


 


Monocytes (%) (Auto)


 


 7.0 %


(5.0-12.0)


 


Neutrophils # (Auto)


 


 6.0 10^3/uL


(1.8-7.7)


 


Lymphocytes # (Auto)


 


 1.35 10^3/uL1


(1.0-4.8)


 


Monocytes # (Auto)


 


 0.6 10^3/uL


(0.3-0.8)


 


Absolute Immature Granulocyte


(auto 


 0.01 10^3 u/L


(0-2)


 


Absolute Eosinophils (auto)


 


 0.3 10^3/uL


(0.0-0.2)  H


 


Immature Granulocytes %


 


 0.10 %


(0.00-0.50)


 


Eosinophils %


 


 4.1 %


(0.0-5.0)


 


Basophils %


 


 0.4 %


(0.0-0.2)  H


 


Basophils #


 


 0.0 10^3/uL


(0.0-0.1)


 


Sodium Level


 


 143 mmol/L


(132-145)


 


Potassium Level


 


 3.8 mmol/L


(3.6-5.2)


 


Chloride Level


 


 104.0 mmol/L


()


 


Carbon Dioxide Level


 


 30.9 mmol/L


(20.0-32)


 


Anion Gap  11.9  


 


Blood Urea Nitrogen


 


 19 mg/dL


(7-18)  H


 


Creatinine


 


 1.31 mg/dL


(0.59-1.40)


 


Estimated GFR (


American) 


 50.5 (>/=60)  





 


Est GFR (CKD-EPI)(Non-Afr


American) 


 41.7 (>/=60)  





 


BUN/Creatinine Ratio  14.0  


 


Glucose Level


 


 140 mg/dL


()  H


 


Calcium Level


 


 8.9 mg/dL


(8.4-10.5)


 


Total Bilirubin


 


 0.3 mg/dL


(0.2-1.0)


 


Aspartate Amino Transferase


(AST) 


 13 U/L (0-35)  





 


Alanine Aminotransferase (ALT)


 


 19 U/L (12-78)





 


Alkaline Phosphatase


 


 42 U/L


()  L


 


Total Protein


 


 6.3 g/dL


(6.4-8.2)  L


 


Albumin


 


 3.4 g/dL


(3.4-5.0)


 


Globulin  2.9  


 


Albumin/Globulin Ratio  1.172  











Progress


Progress


Dr Rosenthal contacted. He states that this is not a urologic problem and if she 

needs to be admitted to call her primary





Patient feels improved on repeat evaluation. CT revealed 2mm left ureter stone 

and UA was concerning for infection. She was given dose of levaquin for 

coverage. She was discussed with her PCP and admission for pain control was 

offered. The patient declines and would rather be discharged home and will see 

her PCP in clinic tomorrow morning at 9am. If she has any worsening of her sx 

she can return to the ER at any time.





ER DEPART


Departure


Time of Disposition:  18:51


Disposition:  01 HOME, SELF-CARE


Impression:  


   Primary Impression:  


   UTI (lower urinary tract infection)


   Additional Impressions:  


   Left flank pain


   Calculus of ureter


Condition:  Improved


Referrals:  


SAURABH BOWERS NP (PCP)


PRIMARY CARE PROVIDER


Comments


levaquin 750mg daily for 10 days


Duration or Time Spent with Pa:  30





Problem Qualifiers











SHON JUNG MD           Jan 20, 2021 15:49

## 2021-01-20 NOTE — DIREP
PROCEDURE:CT ABD/PELVIS W/O

 

TECHNIQUE:Axial cuts were obtained through the abdomen and pelvis without IV 

contrast.  The images were viewed at lung and soft tissue settings.  Sagittal 

and coronal reconstructions are provided.   

 

COMPARISON:[facility], CT, CT ABD/PELVIS W/O, 01/06/2021, 07:50 

PM.

 

INDICATIONS:left flank pain

 

FINDINGS:

LOWER CHEST:Increased left pleural thickening.

LIVER:Normal.

BILIARY:Previous cholecystectomy.

PANCREAS:Normal.

SPLEEN:Normal.

URINARY TRACT:Left ureter is dilated throughout.  This has increased from 

previous study.  There is a 2.5 mm stone in the proximal left ureter series 2, 

image 67.  There is left-sided hydronephrosis.  Tiny 2 mm stone is present in 

the left renal collecting system series 2, image 57.  There are 2 separate 

cortical stones in the lateral aspect of the left kidney measuring 4 mm and 2 

mm respectively series 2 images 54 and 55. Scarring is present in the cortex of 

the left kidney in laterally and in the upper pole.  Scarring is present in the 

right kidney with calcification in the area of the scar series 2, image 76.

ADRENALS:Normal.

AORTA/VASCULAR:Extensive arterial calcifications.

RETROPERITONEUM:Normal.

BOWEL/MESENTERY:Diverticulosis in the descending and sigmoid colon with no 

evidence of diverticulitis.  The appendix is not clearly seen.

ABDOMINAL WALL:Normal.

PELVIS:Previous hysterectomy.

BONES:Disc narrowing with vacuum changes L5-S1.

OTHER:Calcified gluteal injection granulomas.

 

CONCLUSION:

 

1.  Left ureter is dilated throughout as compared to previous study.  This may 

be sequela of a passed stone.  There is a small nonobstructing stone in the 

proximal left ureter series 2, image 67.  There remains left-sided 

hydronephrosis.  Small 2 mm stone in the left renal collecting system.

 

2.  Bilateral renal cortical calcifications and scarring.

 

3.  Increased left pleural thickening.

 

4.  Previous cholecystectomy and hysterectomy.

 

5.  Atherosclerosis.

 

 

Dictated by: Caio Benson M.D. on 01/20/2021 at 05:01 PM     

Electronically Signed By: Caio Benson M.D. on 01/20/2021 at 05:13 PM

## 2021-01-20 NOTE — NUR
DR.NGUYEN DR. JUNG ON THE PHONE WITH DR. CONTEH REGARDING PATIENT CONDITION. 



DR. JUNG PLACED WERO ON HOLD TO SPEAK WITH PATIENT ON DISCHARGE VS. 
ADMISSION FOR PAIN CONTROL.



PATIENT FEELS COMFORTABLE RECEIVING ONE MORE DOSE OF MORPHINE AND BEING 
DISCHARGED HOME.

## 2021-02-06 NOTE — DIREP
PROCEDURE:       XRAY FLUOROSCOPY



COMPARISON:       None.



INDICATIONS:       CYSTO W RETROGRADE



TECHNIQUE:       Intraoperative fluoroscopy



FINDINGS:

Intraoperative fluoroscopy for retrograde evaluation of the left ureter. There 
appears to be a stone in the left intrarenal

collecting system. Mild distention of the collecting system at time of the 
evaluation. Placement of a left ureteral stent.

The right side was not evaluated.

Fluoro time 73.2 seconds



Images:    9

Contrast:    10 cc



CONCLUSION:

Intraoperative fluoroscopy for placement of a left ureteral stent.



Dictated by: Hetal Howell MD on 01/07/2021 at 03:05 PM

Electronically Signed By: Hetal Howell MD on 01/07/2021 at 03:07 PM

MAHSA show

## 2021-02-10 ENCOUNTER — HOSPITAL ENCOUNTER (OUTPATIENT)
Dept: HOSPITAL 65 - LAB | Age: 61
Discharge: HOME | End: 2021-02-10
Attending: INTERNAL MEDICINE
Payer: MEDICARE

## 2021-02-10 DIAGNOSIS — N39.0: ICD-10-CM

## 2021-02-10 DIAGNOSIS — R53.81: ICD-10-CM

## 2021-02-10 DIAGNOSIS — A41.9: ICD-10-CM

## 2021-02-10 DIAGNOSIS — R53.83: Primary | ICD-10-CM

## 2021-02-10 LAB
APPEARANCE UR: CLEAR
BASOPHILS # BLD AUTO: 0.1 10^3/UL (ref 0–0.1)
BASOPHILS NFR BLD AUTO: 0.3 % (ref 0–0.2)
BILIRUB UR STRIP.AUTO-MCNC: NEGATIVE MG/DL
CALCIUM SERPL-MCNC: 11.4 MG/DL (ref 8.4–10.5)
CO2 BLDA-SCNC: 32.7 MMOL/L (ref 20–32)
COLOR UR: YELLOW
EOSINOPHIL # BLD AUTO: 0.5 10^3/UL (ref 0–0.2)
EOSINOPHIL NFR BLD AUTO: 2.2 % (ref 0–5)
EOSINOPHIL NFR BLD: 2 % (ref 1–4)
ERYTHROCYTE [DISTWIDTH] IN BLOOD BY AUTOMATED COUNT: 13.9 % (ref 11.5–14.5)
GLUCOSE PRE 100 G GLC PO SERPL-MCNC: 242 MG/DL (ref 70–110)
LYMPHOCYTES # BLD AUTO: 1.48 10^3/UL1 (ref 1–4.8)
LYMPHOCYTES NFR BLD AUTO: 6.8 % (ref 24–44)
LYMPHOCYTES NFR BLD: 15 % (ref 25–36)
MANUAL DIF COMMENT BLD-IMP: NORMAL
MCH RBC QN AUTO: 28.7 PG (ref 26–34)
MONOCYTES # BLD AUTO: 0.9 10^3/UL (ref 0.3–0.8)
MONOCYTES NFR BLD AUTO: 4.3 % (ref 5–12)
MONOCYTES NFR BLD: 5 % (ref 3–9)
NEUTROPHILS # BLD AUTO: 18.8 10^3/UL (ref 1.8–7.7)
NEUTROPHILS NFR BLD AUTO: 86.2 % (ref 41–85)
NEUTS SEG NFR BLD: 78 % (ref 31–76)
PLATELET # BLD AUTO: 287 10^3/UL (ref 150–400)
UROBILINOGEN UR QL STRIP.AUTO: NORMAL

## 2021-02-10 PROCEDURE — 87086 URINE CULTURE/COLONY COUNT: CPT

## 2021-02-10 PROCEDURE — 85025 COMPLETE CBC W/AUTO DIFF WBC: CPT

## 2021-02-10 PROCEDURE — 81000 URINALYSIS NONAUTO W/SCOPE: CPT

## 2021-02-10 PROCEDURE — 36415 COLL VENOUS BLD VENIPUNCTURE: CPT

## 2021-02-10 PROCEDURE — 86140 C-REACTIVE PROTEIN: CPT

## 2021-02-10 PROCEDURE — 85651 RBC SED RATE NONAUTOMATED: CPT

## 2021-02-10 PROCEDURE — 80048 BASIC METABOLIC PNL TOTAL CA: CPT

## 2021-03-20 ENCOUNTER — HOSPITAL ENCOUNTER (OUTPATIENT)
Dept: HOSPITAL 65 - ER | Age: 61
Setting detail: OBSERVATION
LOS: 2 days | Discharge: HOME | End: 2021-03-22
Attending: SPECIALIST | Admitting: HOSPITALIST
Payer: MEDICARE

## 2021-03-20 VITALS — DIASTOLIC BLOOD PRESSURE: 78 MMHG | SYSTOLIC BLOOD PRESSURE: 113 MMHG

## 2021-03-20 VITALS — SYSTOLIC BLOOD PRESSURE: 140 MMHG | DIASTOLIC BLOOD PRESSURE: 61 MMHG

## 2021-03-20 VITALS — DIASTOLIC BLOOD PRESSURE: 46 MMHG | SYSTOLIC BLOOD PRESSURE: 110 MMHG

## 2021-03-20 VITALS — HEIGHT: 67 IN | WEIGHT: 220 LBS | BODY MASS INDEX: 34.53 KG/M2

## 2021-03-20 VITALS — SYSTOLIC BLOOD PRESSURE: 110 MMHG | DIASTOLIC BLOOD PRESSURE: 60 MMHG

## 2021-03-20 VITALS — SYSTOLIC BLOOD PRESSURE: 99 MMHG | DIASTOLIC BLOOD PRESSURE: 42 MMHG

## 2021-03-20 VITALS — SYSTOLIC BLOOD PRESSURE: 132 MMHG | DIASTOLIC BLOOD PRESSURE: 61 MMHG

## 2021-03-20 VITALS — DIASTOLIC BLOOD PRESSURE: 41 MMHG | SYSTOLIC BLOOD PRESSURE: 101 MMHG

## 2021-03-20 VITALS — SYSTOLIC BLOOD PRESSURE: 103 MMHG | DIASTOLIC BLOOD PRESSURE: 81 MMHG

## 2021-03-20 VITALS — DIASTOLIC BLOOD PRESSURE: 61 MMHG | SYSTOLIC BLOOD PRESSURE: 128 MMHG

## 2021-03-20 VITALS — DIASTOLIC BLOOD PRESSURE: 52 MMHG | SYSTOLIC BLOOD PRESSURE: 105 MMHG

## 2021-03-20 VITALS — SYSTOLIC BLOOD PRESSURE: 135 MMHG | DIASTOLIC BLOOD PRESSURE: 62 MMHG

## 2021-03-20 DIAGNOSIS — K58.9: ICD-10-CM

## 2021-03-20 DIAGNOSIS — E78.00: ICD-10-CM

## 2021-03-20 DIAGNOSIS — Z79.890: ICD-10-CM

## 2021-03-20 DIAGNOSIS — I45.10: ICD-10-CM

## 2021-03-20 DIAGNOSIS — M10.9: ICD-10-CM

## 2021-03-20 DIAGNOSIS — G43.909: Primary | ICD-10-CM

## 2021-03-20 DIAGNOSIS — E11.22: ICD-10-CM

## 2021-03-20 DIAGNOSIS — C92.10: ICD-10-CM

## 2021-03-20 DIAGNOSIS — I50.32: ICD-10-CM

## 2021-03-20 DIAGNOSIS — Z79.84: ICD-10-CM

## 2021-03-20 DIAGNOSIS — D63.1: ICD-10-CM

## 2021-03-20 DIAGNOSIS — I26.99: ICD-10-CM

## 2021-03-20 DIAGNOSIS — G89.29: ICD-10-CM

## 2021-03-20 DIAGNOSIS — R07.89: ICD-10-CM

## 2021-03-20 DIAGNOSIS — M79.7: ICD-10-CM

## 2021-03-20 DIAGNOSIS — I13.0: ICD-10-CM

## 2021-03-20 DIAGNOSIS — Z87.448: ICD-10-CM

## 2021-03-20 DIAGNOSIS — E11.69: ICD-10-CM

## 2021-03-20 DIAGNOSIS — J96.11: ICD-10-CM

## 2021-03-20 DIAGNOSIS — M54.9: ICD-10-CM

## 2021-03-20 DIAGNOSIS — E03.9: ICD-10-CM

## 2021-03-20 DIAGNOSIS — N18.2: ICD-10-CM

## 2021-03-20 DIAGNOSIS — J44.9: ICD-10-CM

## 2021-03-20 DIAGNOSIS — F41.8: ICD-10-CM

## 2021-03-20 DIAGNOSIS — Z90.49: ICD-10-CM

## 2021-03-20 DIAGNOSIS — Z87.442: ICD-10-CM

## 2021-03-20 DIAGNOSIS — Z90.710: ICD-10-CM

## 2021-03-20 DIAGNOSIS — Z20.822: ICD-10-CM

## 2021-03-20 DIAGNOSIS — Z79.899: ICD-10-CM

## 2021-03-20 DIAGNOSIS — Z88.0: ICD-10-CM

## 2021-03-20 DIAGNOSIS — E66.9: ICD-10-CM

## 2021-03-20 DIAGNOSIS — R04.2: ICD-10-CM

## 2021-03-20 DIAGNOSIS — G47.33: ICD-10-CM

## 2021-03-20 LAB
ALP INTEST CFR SERPL: 38 U/L (ref 50–136)
ALT SERPL-CCNC: 18 U/L (ref 12–78)
AST SERPL-CCNC: 15 U/L (ref 0–35)
BASE EXCESS BLDA CALC-SCNC: 1.3 MMOL/L (ref -2–2)
BASOPHILS # BLD AUTO: 0 10^3/UL (ref 0–0.1)
BASOPHILS NFR BLD AUTO: 0.4 % (ref 0–0.2)
BILIRUB UR STRIP.AUTO-MCNC: NEGATIVE MG/DL
CALCIUM SERPL-MCNC: 8.6 MG/DL (ref 8.4–10.5)
CO2 BLDA-SCNC: 30.7 MMOL/L (ref 20–32)
COLOR UR: YELLOW
EOSINOPHIL # BLD AUTO: 0.2 10^3/UL (ref 0–0.2)
EOSINOPHIL NFR BLD AUTO: 2.8 % (ref 0–5)
ERYTHROCYTE [DISTWIDTH] IN BLOOD BY AUTOMATED COUNT: 15.9 % (ref 11.5–14.5)
GLUCOSE PRE 100 G GLC PO SERPL-MCNC: 175 MG/DL (ref 70–110)
HCO3 BLDA-SCNC: 26.6 MMOL/L (ref 22–26)
LYMPHOCYTES # BLD AUTO: 1.11 10^3/UL1 (ref 1–4.8)
LYMPHOCYTES NFR BLD AUTO: 19.8 % (ref 24–44)
MCH RBC QN AUTO: 30.5 PG (ref 26–34)
MONOCYTES # BLD AUTO: 0.3 10^3/UL (ref 0.3–0.8)
MONOCYTES NFR BLD AUTO: 5.7 % (ref 5–12)
NEUTROPHILS # BLD AUTO: 4 10^3/UL (ref 1.8–7.7)
NEUTROPHILS NFR BLD AUTO: 71.3 % (ref 41–85)
PCO2 BLDA: 45 MMHG (ref 35–45)
PH BLDA: 7.39 [PH] (ref 7.35–7.45)
PLATELET # BLD AUTO: 203 10^3/UL (ref 150–400)
UROBILINOGEN UR QL STRIP.AUTO: NEGATIVE

## 2021-03-20 PROCEDURE — 87426 SARSCOV CORONAVIRUS AG IA: CPT

## 2021-03-20 PROCEDURE — 84443 ASSAY THYROID STIM HORMONE: CPT

## 2021-03-20 PROCEDURE — 84145 PROCALCITONIN (PCT): CPT

## 2021-03-20 PROCEDURE — 85379 FIBRIN DEGRADATION QUANT: CPT

## 2021-03-20 PROCEDURE — 78580 LUNG PERFUSION IMAGING: CPT

## 2021-03-20 PROCEDURE — 96376 TX/PRO/DX INJ SAME DRUG ADON: CPT

## 2021-03-20 PROCEDURE — 82803 BLOOD GASES ANY COMBINATION: CPT

## 2021-03-20 PROCEDURE — A9540 TC99M MAA: HCPCS

## 2021-03-20 PROCEDURE — 96372 THER/PROPH/DIAG INJ SC/IM: CPT

## 2021-03-20 PROCEDURE — 36600 WITHDRAWAL OF ARTERIAL BLOOD: CPT

## 2021-03-20 PROCEDURE — 99285 EMERGENCY DEPT VISIT HI MDM: CPT

## 2021-03-20 PROCEDURE — 82948 REAGENT STRIP/BLOOD GLUCOSE: CPT

## 2021-03-20 PROCEDURE — 87040 BLOOD CULTURE FOR BACTERIA: CPT

## 2021-03-20 PROCEDURE — 93005 ELECTROCARDIOGRAM TRACING: CPT

## 2021-03-20 PROCEDURE — 71046 X-RAY EXAM CHEST 2 VIEWS: CPT

## 2021-03-20 PROCEDURE — 96365 THER/PROPH/DIAG IV INF INIT: CPT

## 2021-03-20 PROCEDURE — 87086 URINE CULTURE/COLONY COUNT: CPT

## 2021-03-20 PROCEDURE — 81003 URINALYSIS AUTO W/O SCOPE: CPT

## 2021-03-20 PROCEDURE — 96375 TX/PRO/DX INJ NEW DRUG ADDON: CPT

## 2021-03-20 PROCEDURE — 87804 INFLUENZA ASSAY W/OPTIC: CPT

## 2021-03-20 PROCEDURE — 83036 HEMOGLOBIN GLYCOSYLATED A1C: CPT

## 2021-03-20 PROCEDURE — 71275 CT ANGIOGRAPHY CHEST: CPT

## 2021-03-20 PROCEDURE — 80053 COMPREHEN METABOLIC PANEL: CPT

## 2021-03-20 PROCEDURE — 85025 COMPLETE CBC W/AUTO DIFF WBC: CPT

## 2021-03-20 PROCEDURE — 83605 ASSAY OF LACTIC ACID: CPT

## 2021-03-20 PROCEDURE — 84484 ASSAY OF TROPONIN QUANT: CPT

## 2021-03-20 PROCEDURE — 93306 TTE W/DOPPLER COMPLETE: CPT

## 2021-03-20 PROCEDURE — 74176 CT ABD & PELVIS W/O CONTRAST: CPT

## 2021-03-20 PROCEDURE — 96366 THER/PROPH/DIAG IV INF ADDON: CPT

## 2021-03-20 PROCEDURE — 36415 COLL VENOUS BLD VENIPUNCTURE: CPT

## 2021-03-20 RX ADMIN — Medication SCH UNIT: at 17:10

## 2021-03-20 RX ADMIN — ALLOPURINOL SCH MG: 300 TABLET ORAL at 16:00

## 2021-03-20 RX ADMIN — PREDNISONE SCH MG: 20 TABLET ORAL at 16:00

## 2021-03-20 RX ADMIN — GABAPENTIN SCH MG: 300 CAPSULE ORAL at 15:00

## 2021-03-20 RX ADMIN — GABAPENTIN SCH MG: 300 CAPSULE ORAL at 21:20

## 2021-03-20 RX ADMIN — PANTOPRAZOLE SODIUM SCH MG: 40 TABLET, DELAYED RELEASE ORAL at 15:09

## 2021-03-20 RX ADMIN — CARVEDILOL SCH MG: 12.5 TABLET, FILM COATED ORAL at 21:00

## 2021-03-20 RX ADMIN — MORPHINE SULFATE PRN MG: 4 INJECTION, SOLUTION INTRAMUSCULAR; INTRAVENOUS at 19:50

## 2021-03-20 RX ADMIN — FLAVOXATE HYDROCHLORIDE SCH MG: 100 TABLET, FILM COATED ORAL at 21:21

## 2021-03-20 RX ADMIN — AMLODIPINE BESYLATE SCH MG: 5 TABLET ORAL at 15:08

## 2021-03-20 RX ADMIN — FENOFIBRATE SCH MG: 54 TABLET ORAL at 16:00

## 2021-03-20 RX ADMIN — CARVEDILOL SCH MG: 12.5 TABLET, FILM COATED ORAL at 15:07

## 2021-03-20 RX ADMIN — LOSARTAN POTASSIUM SCH MG: 50 TABLET, FILM COATED ORAL at 15:08

## 2021-03-20 RX ADMIN — ROSUVASTATIN CALCIUM SCH MG: 10 TABLET, FILM COATED ORAL at 21:21

## 2021-03-20 RX ADMIN — GABAPENTIN SCH MG: 300 CAPSULE ORAL at 15:09

## 2021-03-20 RX ADMIN — LEVOFLOXACIN SCH MLS/HR: 5 INJECTION, SOLUTION INTRAVENOUS at 21:21

## 2021-03-20 RX ADMIN — Medication SCH UNIT: at 20:54

## 2021-03-20 RX ADMIN — DULOXETINE HYDROCHLORIDE SCH MG: 30 CAPSULE, DELAYED RELEASE ORAL at 16:00

## 2021-03-20 RX ADMIN — FUROSEMIDE SCH MG: 20 TABLET ORAL at 15:08

## 2021-03-20 RX ADMIN — OXCARBAZEPINE SCH MG: 150 TABLET, FILM COATED ORAL at 15:09

## 2021-03-20 RX ADMIN — LEVOTHYROXINE SODIUM SCH MCG: 75 TABLET ORAL at 16:00

## 2021-03-20 RX ADMIN — MORPHINE SULFATE PRN MG: 4 INJECTION, SOLUTION INTRAMUSCULAR; INTRAVENOUS at 15:03

## 2021-03-20 RX ADMIN — FLAVOXATE HYDROCHLORIDE SCH MG: 100 TABLET, FILM COATED ORAL at 16:00

## 2021-03-20 RX ADMIN — Medication SCH MG: at 16:00

## 2021-03-20 RX ADMIN — POTASSIUM BICARBONATE SCH MEQ: 391 TABLET, EFFERVESCENT ORAL at 15:09

## 2021-03-20 RX ADMIN — Medication SCH UNIT: at 17:01

## 2021-03-20 NOTE — NUR
MEDS GIVEN IV, IV FLUSHED WITH 10 ML'S NS/LOCKED.  PT TRANSFERED TO RADIOLOGY 
FOR CHEST XRAY VIA STRETCHER.

## 2021-03-20 NOTE — NUR
ARRIVAL

PT ARRIVED WITH  C/O MIGRAINE AND KIDNEY PAIN.  PT ESCORTED TO ER 8, 
 AT BEDSIDE.  ERP AT BEDSIDE DURING TRIAGE.  PT C/O MIGRAINE X 4 DAYS, 
DIFFICULTY W/URINATION, BLOOD IN URINE, BURNING WITH URINATION, COUGHING UP 
BLOOD TINGED SPUTUM, FEVER, NAUSEA.  VITALS OBTAINED & DOCUMENTED.

## 2021-03-20 NOTE — DIREP
PROCEDURE:CHEST 2 VIEWS

 

COMPARISON:[facility], CT, CT ABD/PELVIS W/O, 01/22/2021, 09:02 

PM.  [facility], CR, XRAY CHEST 2 VWS, 01/06/2021, 07:46 PM.  

[facility], CR, XRAY CHEST 2 VWS, 11/05/2020, 06:39 AM.

 

INDICATIONS:cough

 

FINDINGS:

LUNGS/PLEURA:Obscuration of the left hemidiaphragm concerning for left basilar 

infiltrate, pneumonia.  Additional streaky opacities in the right lung base are 

minimally more prominent with comparison to prior and may represent atelectasis 

or additional infiltrate.  No pneumothorax.

VASCULATURE:Unremarkable pulmonary vasculature.

CARDIAC:Mild cardiomegaly. 

MEDIASTINUM:No visible mass or adenopathy. 

BONES:No fracture or visible bony lesion. 

OTHER:The patient's arm overlies the chest on lateral view which may 

exaggerate cardiac size assessment and basilar opacity.  

 

CONCLUSION:

1. Obscuration of the left hemidiaphragm concerning for left basilar 

infiltrate, pneumonia.

2. Mild cardiomegaly.

3. Additional streaky opacities in the right lung base, as above. 

 

 

Dictated by: Hetal Howell MD on 03/20/2021 at 01:54 AM     

Electronically Signed By: Hetal Howell MD on 03/20/2021 at 01:56 AM

## 2021-03-20 NOTE — NUR
ADMIT

-------------------------------------------------------------------------------

Addendum: 03/20/21 at 0442 by LISA

-------------------------------------------------------------------------------

ADMIT

T ADMITTED TO SERVICES OF DR. ARCHER ROOM 337 DX: RIGHT LOWER LOBE PE.  REPORT 
CALLED TO YANIV AT 4:13 AM.  PT TRANSPORTED TO [facility] VIA STRETCHER WITH O2 VIA 
NC 2 L.  PT AVI TRANSFER WELL.

## 2021-03-20 NOTE — PCM.EKG
[facility]

                                       

Test Date:    2021               Test Time:    01:55:57

Pat Name:     MCKENZIE THURMAN          Department:   

Patient ID:   [id number]          Room:          

Gender:       F                        Technician:   ED

:          1960               Requested By: TYSHAWN ESPINOZA

Order Number: [id number]           Reading MD:     

                                 Measurements

Intervals                              Axis          

Rate:         69                       P:            55

OH:           163                      QRS:          30

QRSD:         114                      T:            -6

QT:           446                                    

QTc:          478                                    

                           Interpretive Statements

Sinus rhythm

Incomplete right bundle branch block

Compared to ECG 2021 05:26:38

Incomplete right bundle-branch block now present

Sinus tachycardia no longer present



Please click the below link to view image of tracing.

## 2021-03-20 NOTE — DIREP
PROCEDURE:CTA CHEST

 

COMPARISON:[facility], CT, CT CHEST W/O, 12/18/2020, 01:59 PM.  

[facility], CR, XRAY CHEST 2 VWS, 03/20/2021, 01:08 AM.

 

INDICATIONS:chest pain

 

TECHNIQUE:Post contrast axial images through the chest with multiplanar MIP/3D 

reconstructions.  

 

FINDINGS:

PULMONARY ARTERIES:Although there are artifacts in the right lower lobe 

related respiratory motion there is at least 1 filling defects suspicious for 

pulmonary embolism the segmental artery to the right

LUNGS:Ground-glass opacity throughout the left lower lobe may represent 

passive atelectasis or infiltrate.  There is also dependent ground-glass 

throughout the lungs. 

PLEURA:Small left pleural fluid, increased with comparison to prior.  

CARDIAC:Mild cardiomegaly.

RV:LV ratio (norm <0.9): Normal.

THORACIC AORTA:Normal.

MEDIASTINUM:Normal.

THYROID:Normal.

BONES:Healed right-sided rib fractures.  Subtle increased density in the 

minutes sternum measuring approximately 1 cm may be related to previous trauma, 

small osseous bone lesion not excluded, unchanged.

OTHER:Fluid within esophagus seen to the thoracic inlet may suggest reflux.

 

CONCLUSION:

1. Findings concerning for pulmonary embolism in the right lower lobe.

2. Increased small left pleural fluid.

3. Mild cardiomegaly.

4. Ground-glass opacity in the left lower lobe adjacent to the pleural fluid 

may represent passive atelectasis or early infiltrate.  There is also dependent 

atelectasis elsewhere. 

 

This report was called by telephone at 3:31 am on March 20, 2021 to Dr. Sally Boo                                                      .

 

Dictated by: Hetal Howell MD on 03/20/2021 at 03:19 AM     

Electronically Signed By: Hetal Howell MD on 03/20/2021 at 03:33 AM

## 2021-03-20 NOTE — DIREP
PROCEDURE:CT ABDOMEN/PELVIS W/O CONTRAST

 

COMPARISON:[facility], CT, CTA CHEST, 03/20/2021, 02:29 AM.  

[facility], CT, CT ABD/PELVIS W/O, 01/22/2021, 09:02 PM.

 

INDICATIONS:fever flank pain and h/o nephrolithiasis

 

TECHNIQUE:Axial images were created through the abdomen and pelvis without 

intravenous contrast material. 

No oral contrast was administered.

Sagittal and coronal reconstructions were performed from source images.

 

FINDINGS:

LUNG BASES:The region.  Minimal posterior dependent atelectasis of the left 

lower lobe.  Normal heart size.

LIVER:Normal.  No significant liver lesions are identified.  

BILIARY:Normal.  No visible dilatation or calcification.  

PANCREAS:Normal.  No lesion, fluid collection, ductal dilatation, or atrophy.  

 

SPLEEN:Normal.  No enlargement or focal lesion. 

ADRENALS:Normal.  No mass or enlargement.  

URINARY TRACT:Multiple focal areas of cortical thinning and scarring in each 

kidney.  Residual contrast in the bilateral renal collecting systems and 

ureters due to recent CT angiography of the chest/pulmonary arteries.  No 

dilatation of the ureters.

AORTA/VASCULAR:Scattered arterial calcifications.  No aneurysm.

RETROPERITONEUM:Normal.  No mass or adenopathy.  

BOWEL/MESENTERY:Somewhat limited evaluation due to lack of specific oral 

contrast or IV contrast administration.  No intestinal obstruction, free air, 

or free fluid.  Multiple colonic diverticula without diverticulitis.  Was not 

visualized.  No evidence of ascites.

ABDOMINAL WALL:Normal.  No mass or hernia.  

PELVIC ORGANS:Hysterectomy.  Contrast in urinary bladder.

BONES:No acute pathology.  Degenerative changes noted in the spine.

OTHER:Calcified injection granulomas in each buttock.

 

CONCLUSION:

1.  Multiple renal scars in each kidney likely due to previous 

infection/inflammatory process.  Residual contrast in the renal collecting 

system from recent CT pulmonary angiogram.  Because the residual renal 

collecting system contrast, cannot totally rule out calculi in each kidney, 

which may be obstructed by the contrast.  No evidence of calculi or obstruction 

in either ureter.  

2.  Prior cholecystectomy and hysterectomy.  Multiple colonic diverticula 

without diverticulitis.

3.  Please see above discussion for details of other findings.

 

 

Dictated by: Steffen Smith M.D. on 03/20/2021 at 10:29 AM     

Electronically Signed By: Steffen Smith M.D. on 03/20/2021 at 10:36 AM

## 2021-03-20 NOTE — ER.PDOC
General


Chief Complaint:  General Complaint


Stated Complaint:  MIGRAINE,R KIDNEY PAIN


TRAVEL OUT OF US:  No


Time seen by MD:  00:58


Source:  patient, family


Exam Limitations:  no limitations





History of Present Illness


Timing/Duration:  other (4 days)


Severity:  severe


Modifying Factors:  improves with movement


Associated Symptoms:  chest pain, cough, fever/chills, headaches, 

nausea/vomiting


Allergies:  


Coded Allergies:  


     Penicillins (Verified  Allergy, Severe, unknown, 1/24/17)


   "CONVULSIONS"


     aspirin (Verified  Allergy, Severe, 1/24/17)


   "CONVULSIONS"


     cefamandole nafate (Verified  Allergy, Severe, 1/24/17)


     iodine (Verified  Allergy, Severe, Shortness of Breath, 1/24/17)


     quetiapine (Verified  Allergy, Severe, "I GO CRAZY, OUT OF MY HEAD", 

1/24/17)


     Cephalexin Monohydrate (Verified  Allergy, Intermediate, Hives, 1/24/17)


     egg (Verified  Allergy, Intermediate, Headache, 1/24/17)


     latex (Verified  Allergy, Intermediate, HIVES, 1/24/17)


     hydrocodone bitartrate (Verified  Allergy, Unknown, 1/24/17)


     phenazopyridine HCl (Verified  Allergy, Unknown, 1/24/17)


     codeine (Verified  Adverse Reaction, Mild, 1/24/17)


   "I GO CRAZY"


Uncoded Allergies:  


     SULFA (Allergy, Unknown, 5/28/14)


Home Meds


Active Scripts


[Flavoxate]   No Conflict Check, 100 MG PO TID for BLADDER SPASM, #30


   Prov:DAVY ROSENTHAL MD         1/20/21


Oxcarbazepine (OXCARBAZEPINE) 150 Mg Tablet, 300 MG PO DAILY for 30 Days, TAB


   Prov:DWAIN WINKLER MD         1/10/20


Losartan Potassium (COZAAR) 50 Mg Tablet, 100 MG PO DAILY for 30 Days, #30 TAB


   Prov:DWAIN WINKLER MD         1/10/20


Prednisone (PREDNISONE) 20 Mg Tablet, 40 MG PO DAILY24 for 30 Days


   Prov:DWAIN WINKLER MD         12/5/19


Pantoprazole Sodium (PROTONIX) 40 Mg Tablet.dr, 40 MG PO DAILY for 30 Days, #30


   Prov:DWAIN WINKLER MD         12/5/19


Lancets (ACCU-CHEK) 1 Each Each, 1 EACH MC Q6 for 30 Days, #30 EACH


   Prov:DWAIN WINKLER MD         7/12/19


Amlodipine Besylate (NORVASC) 5 Mg Tablet, 5 MG PO DAILY for 30 Days, TABLET


   Prov:DWAIN WINKLER MD         7/12/19


Reported Medications


Carvedilol 25MG (COREG 25MG) 25 Mg Tablet, 1 TAB PO BID, #60 TAB 5 Refills


   12/3/19


Tramadol Hcl (TRAMADOL HCL) 50 Mg Tablet, 50 MG PO PRN PRN for PAIN, TABLET


   12/2/19


Metformin Hcl (METFORMIN HCL) 500 Mg Tablet, 1 TAB PO BID, #60 TAB 3 Refills


   12/2/19


Rosuvastatin 10MG (CRESTOR 10MG) 10 Mg Tablet, 1 TAB PO DAILY, #30 TAB 5 Refills


   12/2/19


Fenofibrate (FENOFIBRATE) 160 Mg Tablet, 160 MG PO DAILY24, TABLET


   7/10/19


Potassium Citrate (POTASSIUM CITRATE) 10 Meq Tablet.er, 10 MEQ PO DAILY24


   8/6/18


Bupropion Hcl (BUPROPION XL) 300 Mg Tab.er.24h, 300 MG PO DAILY24


   8/6/18


Ondansetron (ZOFRAN ODT) 8 Mg Tab.rapdis, 8 MG PO DAILY24 for N/V, TAB


   8/6/18


Albuterol Sulfate (VENTOLIN HFA) 18 Gm Hfa.aer.ad, 90 MCG IH Q6HR PRN for 

SHORTNESS OF BREATH


   8/6/18


Allopurinol (ALLOPURINOL) 300 Mg Tablet, 1 TAB PO DAILY


   8/6/18


Duloxetine Hcl (CYMBALTA) 60 Mg Capsule.dr, 60 MG PO DAILY


   1/24/17


Tiotropium Bromide (SPIRIVA) 18 Mcg Cap.w.dev, 1 CAP IH DAILY


   10/14/16


Furosemide (LASIX) 20 Mg Tablet, 1 TAB PO DAILY


   9/24/15


Tizanidine Hcl (TIZANIDINE HCL) 4 Mg Tablet, 1 TAB PO TID PRN for MUSCLE SPASM


   9/24/15


Gabapentin (NEURONTIN) 300 Mg Capsule, 2 CAP PO TID


   11/21/14


Alprazolam (XANAX) 2 Mg Tablet, 2 MG PO TID, TABLET


   11/17/14


Linaclotide (LINZESS) 290 Mcg Capsule, 290 MCG PO DAILY, CAPSULE


   11/17/14


Cholecalciferol (Vitamin D3) (VITAMIN D) 10,000 Unit Capsule, 07693 UNIT PO 3 

TIMES A WEEK, CAPSULE


   11/17/14


Levothyroxine Sodium (LEVOTHYROXINE SODIUM) 200 Mcg Tablet, 150 MCG PO DAILY, 

TABLET


   6/13/14


Discontinued Reported Medications


Clonidine Hcl (CLONIDINE HCL) 0.2 Mg Tablet, 1 TAB PO HS, #30 TAB 2 Refills


   12/2/19


Dasatinib (SPRYCEL) 100 Mg Tablet, 90 MG PO DAILY24, TABLET


   8/6/18


Dasatinib (SPRYCEL) 80 Mg Tablet, 90 MG PO DAILY24, TABLET


   8/6/18





Past Medical History


Medical History:  cancer, COPD, hypertension


Surgical History:  appendectomy, cholecystectomy, hysterectomy, tonsillectomy





Social History


Alcohol Use:  none


Drug Use:  none





Results/Orders


Results/Orders





Orders - TYSHAWN ESPINOZA MD


Cbc With Auto Diff (3/20/21 01:03)


Comprehensive Metabolic Panel (3/20/21 01:03)


Lactic Acid(Ml) (3/20/21 01:03)


Urinalysis (3/20/21 01:03)


Blood Culture (3/20/21 01:03)


Xr Chest 2v (3/20/21 01:03)


Saline Lock (3/20/21 01:03)


Troponin I (3/20/21 01:03)


D-Dimer (3/20/21 01:03)


Ekg-Routine (3/20/21 01:03)


Prochlorperazine Edisylate (Compazine) (3/20/21 01:03)


Diphenhydramine Hcl (Benadryl) (3/20/21 01:03)


Cta Chest (3/20/21 02:14)


Methylprednisolone Sod Succ (Solu-Medrol (3/20/21 02:14)


Covid19 Antigen Sophia Joanna (3/20/21 02:24)


Methylprednisolone Sod Succ (Solu-Medrol (3/20/21 02:29)


Influenza A&B (3/20/21 02:53)


Enoxaparin Sodium (Lovenox) (3/20/21 03:37)


Troponin I (3/20/21 03:39)


Procalcitonin (3/20/21 03:51)





Vital Signs








  Date Time  Temp Pulse Resp B/P (MAP) Pulse Ox O2 Delivery O2 Flow Rate FiO2


 


3/20/21 03:17  65 18 99/42 (61) 100 Nasal Canula 2.00 


 


3/20/21 02:00  71 18 103/81 (88) 100 Nasal Canula 2.00 


 


3/20/21 01:45  92 18 101/41 (61) 99 Nasal Canula 2.00 


 


3/20/21 01:00 98.2 104 18 140/61 (87) 96 Nasal Canula 2.00 


 


3/20/21 01:00 98.2 104 18     


 


3/20/21 01:00 98.2 104 18  96   








Administered Medications








 Medications


  (Trade)  Dose


 Ordered  Sig/Tesfaye


 Route


 PRN Reason  Start Time


 Stop Time Status Last Admin


Dose Admin


 


 Diphenhydramine


 HCl


  (Benadryl)  25 mg  STAT  STAT


 IV


   3/20/21 01:03


 3/20/21 01:04 UNV 3/20/21 01:29


25 MG


 


 Methylprednisolone


 Sodium Succinate


  (Solu-Medrol)  125 mg  STAT  STAT


 IV


   3/20/21 02:14


 3/20/21 02:20 DC 3/20/21 02:52


125 MG


 


 Prochlorperazine


 Edisylate


  (Compazine)  10 mg  STAT  STAT


 IV


   3/20/21 01:03


 3/20/21 01:04 UNV 3/20/21 01:30


10 MG








                                Laboratory Tests








Test


 3/20/21


00:40 3/20/21


01:20 3/20/21


03:05 3/20/21


03:51


 


Urine Collection Type CCMS     


 


Urine Color


 YELLOW


(YELLOW) 


 


 





 


Urine Appearance CLEAR (CLEAR)     


 


Urine Bilirubin


 NEGATIVE MG/DL


(NEGATIVE) 


 


 





 


Urine Ketones


 NEGATIVE


(NEGATIVE) 


 


 





 


Urine Specific Gravity


 1.015


(1.005-1.035) 


 


 





 


Urine pH 6.0 (5.0-6.0)     


 


Urine Protein


 NEGATIVE


(NEGATIVE) 


 


 





 


Urine Urobilinogen


 NEGATIVE


(NEGATIVE) 


 


 





 


Urine Nitrate


 NEGATIVE


(NEGATIVE) 


 


 





 


Urine Leukocyte Esterase


 NEGATIVE


(NEGATIVE) 


 


 





 


Urine Blood


 NEGATIVE


(NEGATIVE) 


 


 





 


Urine Glucose


 NEGATIVE


(NEGATIVE) 


 


 





 


White Blood Count


 


 5.6 10^3/uL


(4.5-11.0) 


 





 


Red Blood Count


 


 2.92 10^6/uL


(4.00-5.20)  L 


 





 


Hemoglobin


 


 8.9 g/dL


(12.0-15.0)  L 


 





 


Hematocrit


 


 27.9 %


(36.0-46.0)  L 


 





 


Mean Corpuscular Volume


 


 95.5 fL


() 


 





 


Mean Corpuscular Hemoglobin


 


 30.5 pg


(26-34) 


 





 


Mean Corpuscular Hemoglobin


Concent 


 31.9 g/dL


(33-36.5)  L 


 





 


Red Cell Distribution Width


 


 15.9 %


(11.5-14.5)  H 


 





 


Platelet Count


 


 203 10^3/uL


(150-400) 


 





 


Mean Platelet Volume


 


 9.3 fL


(7.8-11.0) 


 





 


Neutrophils (%) (Auto)


 


 71.3 %


(41.0-85.0) 


 





 


Lymphocytes (%) (Auto)


 


 19.8 %


(24.0-44.0)  L 


 





 


Monocytes (%) (Auto)


 


 5.7 %


(5.0-12.0) 


 





 


Neutrophils # (Auto)


 


 4.0 10^3/uL


(1.8-7.7) 


 





 


Lymphocytes # (Auto)


 


 1.11 10^3/uL1


(1.0-4.8) 


 





 


Monocytes # (Auto)


 


 0.3 10^3/uL


(0.3-0.8) 


 





 


Absolute Immature Granulocyte


(auto 


 0 10^3 u/L


(0-2) 


 





 


Absolute Eosinophils (auto)


 


 0.2 10^3/uL


(0.0-0.2) 


 





 


Immature Granulocytes %


 


 0.00 %


(0.00-0.50) 


 





 


Eosinophils %


 


 2.8 %


(0.0-5.0) 


 





 


Basophils %


 


 0.4 %


(0.0-0.2)  H 


 





 


Basophils #


 


 0.0 10^3/uL


(0.0-0.1) 


 





 


D-Dimer


 


 1.20 mg/L


(0.19-0.49)  *H 


 





 


Sodium Level


 


 142 mmol/L


(132-145) 


 





 


Potassium Level


 


 4.0 mmol/L


(3.6-5.2) 


 





 


Chloride Level


 


 105.0 mmol/L


() 


 





 


Carbon Dioxide Level


 


 30.7 mmol/L


(20.0-32) 


 





 


Anion Gap  10.3    


 


Blood Urea Nitrogen


 


 13 mg/dL


(7-18) 


 





 


Creatinine


 


 1.40 mg/dL


(0.59-1.40) 


 





 


Estimated GFR (


American) 


 46.7 (>/=60)  


 


 





 


Est GFR (CKD-EPI)(Non-Afr


American) 


 38.6 (>/=60)  


 


 





 


BUN/Creatinine Ratio  9.0    


 


Glucose Level


 


 175 mg/dL


()  H 


 





 


Lactic Acid Level


 


 1.7 mmol/L


(0.5-1.9) 


 





 


Calcium Level


 


 8.6 mg/dL


(8.4-10.5) 


 





 


Total Bilirubin


 


 0.2 mg/dL


(0.2-1.0) 


 





 


Aspartate Amino Transferase


(AST) 


 15 U/L (0-35)  


 


 





 


Alanine Aminotransferase (ALT)


 


 18 U/L (12-78)


 


 





 


Alkaline Phosphatase


 


 38 U/L


()  L 


 





 


Troponin I


 


 < 0.02 ng/mL


(0.00-0.05) 


 < 0.02 ng/mL


(0.00-0.05)


 


Total Protein


 


 6.8 g/dL


(6.4-8.2) 


 





 


Albumin


 


 3.0 g/dL


(3.4-5.0)  L 


 





 


Globulin  3.8    


 


Albumin/Globulin Ratio  0.789    


 


Influenza Type A Antigen


 


 


 NEGATIVE (NEG)


 





 


Influenza B Immunofluorescence


 


 


 NEGATIVE (NEG)


 





 


SARS-CoV-2 Antigen (Rapid)


 


 


 NEGATIVE


(NEGATIVE) 














ER DEPART


Departure


Time of Disposition:  03:50


Disposition:  09 ADMITTED AS INPATIENT


Impression:  


   Primary Impression:  


   Pulmonary embolism


   Additional Impressions:  


   Fever


   Chest pain


   Migraine


Condition:  Stable


If Transfer, List PT Destinati:  0350 spoke to Dr. Tilley


Referrals:  


MARISABEL CONTEH MD (PCP)


PRIMARY CARE PROVIDER


Duration or Time Spent with Pa:  40 minutes





Problem Qualifiers








   Primary Impression:  


   Pulmonary embolism


   Pulmonary embolism type:  unspecified  Chronicity:  acute  Acute cor 

   pulmonale presence:  unspecified  Qualified Codes:  I26.99 - Other pulmonary 

   embolism without acute cor pulmonale


   Additional Impressions:  


   Fever


   Fever type:  unspecified  Qualified Codes:  R50.9 - Fever, unspecified


   Chest pain


   Chest pain type:  unspecified  Qualified Codes:  R07.9 - Chest pain, 

   unspecified


   Migraine


   Migraine type:  unspecified  Status migrainosus presence:  without status 

   migrainosus  Intractability:  not intractable  Qualified Codes:  G43.909 - 

   Migraine, unspecified, not intractable, without status migrainosus








TYSHAWN ESPINOZA MD          Mar 20, 2021 01:18

## 2021-03-20 NOTE — PCM.HP
History of Present Illness


Hx of Present Illness


This is a 60-year-old female with multiple medical problems, chronic migraine 

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

2-4 L of home oxygen.  She does have recurrent nephrolithiasis carries a 

diagnosis of chronic pyelonephritis.  2 months ago she had come to our hospital 

she was found to have candidemia thought to be secondary to nephrolithiasis so 

she had to be transferred to a higher level care.


For the past 5 days she has had more or less constant headache which is 

reminiscent of her migraine attacks, this is associated with photophobia and 

phonophobia.  This is a not been associated with any weakness or numbness in any

extremity.  She has had some nausea but no vomiting.  She had been taking 

Percogesic at home without much relief


She's been running a temperature under 1.8 was reported home the past few days. 

She's been coughing up blood and brown colored sputum been complaining of left 

lower rib cage pain which worsens when she coughs.  She denies any shortness of 

breath of her normal.


She is able to ambulate in her house with her oxygen and do her daily chores 

without any dyspnea.  She denies any sick contacts.  She has not been vaccinated

for covid 19 or the flu.


Course in the emergency room: She was afebrile and her sats were 99% on 2 L 

urinalysis checked out to be fine d-dimer is only 1.2 negative for influenza and

SARS CoV-2 , EKG reviewed by me showed sinus rhythm, incomplete right bundle 

david block with T-wave inversions in V1 to V4 which were unchanged from an EKG

done 2 months ago.  Patient's initial lactic acid level was negative and normal 

white blood cell count was 27.9, chest x-ray showed possible left lower lobe 

infiltrate CT of the chest was done with with the patient being premedicated 

with Benadryl and Solu-Medrol because of possible dye allergy showed the patient

had a right lower lobe pulmonary embolism which was of moderate size and a 

moderate-sized left pleural effusion.  Patient has received 1 dose of Lovenox 

and has been admitted to the hospitalist service.


Of note patient reports that she had an episode of severe hemoptysis because of 

an issue with her platelets while she was on Sprycel treatment for CML.  That ti

me she had to be intubated and was on that for a few days. she is now on 

Gleevac. Also over the past few weeks she has noticed some coughing up brown 

colored phlegm.  She does have a pulmonologist in [facility] of the name Dr Munson and hematologist Dr Colette Sauceda.


Past Medical History


Hx Last Menstrual Period:  hysteretomy total


Past Surgical History:  


(1) Hypothyroidism


ICD Code:  E03.9 - Hypothyroidism, unspecified


SNOMED:  04614521


(2) Diabetes mellitus type 2 in obese


ICD Code:  E11.69 - Type 2 diabetes mellitus with other specified complication; 

E66.9 - Obesity, unspecified


SNOMED:  22321345


(3) Gout


ICD Code:  M10.9 - Gout, unspecified


SNOMED:  88619202


(4) Depression


ICD Code:  F32.9 - Major depressive disorder, single episode, unspecified


SNOMED:  55334018


(5) Anxiety


ICD Code:  F41.9 - Anxiety disorder, unspecified


SNOMED:  10262320


(6) IBS (irritable bowel syndrome)


ICD Code:  K58.9 - Irritable bowel syndrome without diarrhea


SNOMED:  61950524


(7) History of IBS


ICD Code:  Z87.19 - Personal history of other diseases of the digestive system


SNOMED:  63063848638734


(8) CML in remission


ICD Code:  C92.11 - Chronic myeloid leukemia, BCR/ABL-positive, in remission


SNOMED:  02450795


(9) AZAR on CPAP


Status:  Chronic


ICD Code:  G47.33 - Obstructive sleep apnea (adult) (pediatric); Z99.89 - 

Dependence on other enabling machines and devices


SNOMED:  39839076, 955011375


(10) Chronic hypoxemic respiratory failure


Status:  Chronic


ICD Code:  J96.11 - Chronic respiratory failure with hypoxia


SNOMED:  095212209


(11) Gouty arthritis


Status:  Acute


ICD Code:  M10.9 - Gout, unspecified


SNOMED:  02949368


(12) Sciatica


Status:  Acute


ICD Code:  M54.30 - Sciatica, unspecified side


SNOMED:  67331511


(13) Bilateral kidney stones


Status:  Acute


ICD Code:  N20.0 - Calculus of kidney


SNOMED:  34680690


(14) Knee pain, chronic


Status:  Acute


ICD Code:  M25.569 - Pain in unspecified knee; G89.29 - Other chronic pain


SNOMED:  89098792


(15) Migraine headache


ICD Code:  G43.909 - Migraine, unspecified, not intractable, without status 

migrainosus


SNOMED:  70279222


(16) Migraine


Status:  Acute


ICD Code:  G43.909 - Migraine, unspecified, not intractable, without status 

migrainosus


SNOMED:  21891760


(17) Hx of cholecystectomy


ICD Code:  Z90.49 - Acquired absence of other specified parts of digestive tract


SNOMED:  35477532, 354315608


(18) Hx of appendectomy


ICD Code:  Z90.49 - Acquired absence of other specified parts of digestive tract


SNOMED:  585087753


(19) H/O ventral hernia repair


ICD Code:  Z98.890 - Other specified postprocedural states; Z87.19 - Personal 

history of other diseases of the digestive system


SNOMED:  216213675, 701895065, [id number]


(20) History of hysterectomy with bilateral oophorectomy


ICD Code:  Z90.710 - Acquired absence of both cervix and uterus; Z90.722 - 

Acquired absence of ovaries, bilateral


SNOMED:  816610626, 327903415


Vaccines/Immunizations up-to-d:  No





Travel History


EBOLA RISK:Travel to/contact w:  No


Is pt experiencing any Ebola s:  No





Review of Systems


Constitutional:  Fever, Malaise


Eyes:  Vision change


Respiratory:  Cough, Hemoptysis, Pleuritic Pain, Sputum


Cardiovascular:  Chest Pain


Gastrointestinal:  Vomiting, Abdominal Pain, Constipation


Genitourinary:  Dysuria


Musculoskeletal:  neck pain, back pain


Neurological:  Weakness, Numbness, Incoordination, Change in speech, Confusion, 

Seizures, Other


Allergies:  


Coded Allergies:  


     Penicillins (Verified  Allergy, Severe, unknown, 17)


   "CONVULSIONS"


     aspirin (Verified  Allergy, Severe, 17)


   "CONVULSIONS"


     cefamandole nafate (Verified  Allergy, Severe, 17)


     iodine (Verified  Allergy, Severe, Shortness of Breath, 17)


     quetiapine (Verified  Allergy, Severe, "I GO CRAZY, OUT OF MY HEAD", 

17)


     Cephalexin Monohydrate (Verified  Allergy, Intermediate, Hives, 17)


     egg (Verified  Allergy, Intermediate, Headache, 17)


     latex (Verified  Allergy, Intermediate, HIVES, 17)


     hydrocodone bitartrate (Verified  Allergy, Unknown, 17)


     phenazopyridine HCl (Verified  Allergy, Unknown, 17)


     codeine (Verified  Adverse Reaction, Mild, 17)


   "I GO CRAZY"


Uncoded Allergies:  


     SULFA (Allergy, Unknown, 14)


Scheduled


Allopurinol (Allopurinol), 1 TAB PO DAILY, (Reported)


Alprazolam (Xanax), 2 MG PO TID, (Reported)


Amlodipine Besylate (Norvasc), 5 MG PO DAILY


Bupropion Hcl (Bupropion Xl), 300 MG PO DAILY24, (Reported)


Carvedilol 25MG (Coreg 25MG), 1 TAB PO BID, (Reported)


Cholecalciferol (Vitamin D3) (Vitamin D), 50,000 UNIT PO 3 TIMES A WEEK, 

(Reported)


Duloxetine Hcl (Cymbalta), 60 MG PO DAILY, (Reported)


Fenofibrate (Fenofibrate), 160 MG PO DAILY24, (Reported)


Furosemide (Lasix), 1 TAB PO DAILY, (Reported)


Gabapentin (Neurontin), 2 CAP PO TID, (Reported)


Levothyroxine Sodium (Levothyroxine Sodium), 150 MCG PO DAILY, (Reported)


Linaclotide (Linzess), 290 MCG PO DAILY, (Reported)


Losartan Potassium (Cozaar), 100 MG PO DAILY


Metformin Hcl (Metformin Hcl), 1 TAB PO BID, (Reported)


Ondansetron (Zofran Odt), 8 MG PO DAILY24, (Reported)


Oxcarbazepine (Oxcarbazepine), 300 MG PO DAILY


Pantoprazole Sodium (Protonix), 40 MG PO DAILY


Potassium Citrate (Potassium Citrate), 10 MEQ PO DAILY24, (Reported)


Prednisone (Prednisone), 40 MG PO DAILY24


Rosuvastatin 10MG (Crestor 10MG), 1 TAB PO DAILY, (Reported)


Tiotropium Bromide (Spiriva), 1 CAP IH DAILY, (Reported)


[Flavoxate], 100 MG PO TID





Scheduled PRN


Albuterol Sulfate (Ventolin Hfa), 90 MCG IH Q6HR PRN for SHORTNESS OF BREATH, 

(Reported)


Tizanidine Hcl (Tizanidine Hcl), 1 TAB PO TID PRN for MUSCLE SPASM, (Reported)


Tramadol Hcl (Tramadol Hcl), 50 MG PO PRN PRN for PAIN, (Reported)





Discontinued Medications


Clonidine Hcl (Clonidine Hcl), 1 TAB PO HS, (Reported)


   Discontinued Reason: No Longer Taking


Dasatinib (Sprycel), 90 MG PO DAILY24, (Reported)


   Discontinued Reason: No Longer Taking


Dasatinib (Sprycel), 90 MG PO DAILY24, (Reported)


   Discontinued Reason: No Longer Taking





Durable Medical Equipment


Lancets (Accu-Chek), 1 EACH  Q6, (DME)





LABS


LAB RESULTS





Laboratory Tests








Test


 3/20/21


00:40 3/20/21


01:20 3/20/21


03:05 3/20/21


03:51


 


Urine Collection Type CCMS    


 


Urine Color YELLOW    


 


Urine Appearance CLEAR    


 


Urine Bilirubin NEGATIVE MG/DL    


 


Urine Ketones NEGATIVE    


 


Urine Specific Gravity 1.015    


 


Urine pH 6.0    


 


Urine Protein NEGATIVE    


 


Urine Urobilinogen NEGATIVE    


 


Urine Nitrate NEGATIVE    


 


Urine Leukocyte Esterase NEGATIVE    


 


Urine Blood NEGATIVE    


 


Urine Glucose NEGATIVE    


 


White Blood Count  5.6 10^3/uL   


 


Red Blood Count  2.92 10^6/uL   


 


Hemoglobin  8.9 g/dL   


 


Hematocrit  27.9 %   


 


Mean Corpuscular Volume  95.5 fL   


 


Mean Corpuscular Hemoglobin  30.5 pg   


 


Mean Corpuscular Hemoglobin


Concent 


 31.9 g/dL 


 


 





 


Red Cell Distribution Width  15.9 %   


 


Platelet Count  203 10^3/uL   


 


Mean Platelet Volume  9.3 fL   


 


Neutrophils (%) (Auto)  71.3 %   


 


Lymphocytes (%) (Auto)  19.8 %   


 


Monocytes (%) (Auto)  5.7 %   


 


Neutrophils # (Auto)  4.0 10^3/uL   


 


Lymphocytes # (Auto)  1.11 10^3/uL1   


 


Monocytes # (Auto)  0.3 10^3/uL   


 


Absolute Immature Granulocyte


(auto 


 0 10^3 u/L 


 


 





 


Absolute Eosinophils (auto)  0.2 10^3/uL   


 


Immature Granulocytes %  0.00 %   


 


Eosinophils %  2.8 %   


 


Basophils %  0.4 %   


 


Basophils #  0.0 10^3/uL   


 


D-Dimer  1.20 mg/L   


 


Sodium Level  142 mmol/L   


 


Potassium Level  4.0 mmol/L   


 


Chloride Level  105.0 mmol/L   


 


Carbon Dioxide Level  30.7 mmol/L   


 


Anion Gap  10.3   


 


Blood Urea Nitrogen  13 mg/dL   


 


Creatinine  1.40 mg/dL   


 


Estimated GFR (


American) 


 46.7 


 


 





 


Est GFR (CKD-EPI)(Non-Afr


American) 


 38.6 


 


 





 


BUN/Creatinine Ratio  9.0   


 


Glucose Level  175 mg/dL   


 


Lactic Acid Level  1.7 mmol/L   


 


Calcium Level  8.6 mg/dL   


 


Total Bilirubin  0.2 mg/dL   


 


Aspartate Amino Transf


(AST/SGOT) 


 15 U/L 


 


 





 


Alanine Aminotransferase


(ALT/SGPT) 


 18 U/L 


 


 





 


Alkaline Phosphatase  38 U/L   


 


Troponin I  < 0.02 ng/mL   < 0.02 ng/mL 


 


Total Protein  6.8 g/dL   


 


Albumin  3.0 g/dL   


 


Globulin  3.8   


 


Albumin/Globulin Ratio  0.789   


 


Influenza Type A Antigen   NEGATIVE  


 


Influenza B Immunofluorescence   NEGATIVE  


 


SARS-CoV-2 Antigen (Rapid)   NEGATIVE  


 


Procalcitonin    0.10 ng/mL 


 


Test


 3/20/21


06:25 


 


 





 


Bedside Glucose 201    








Current Medications








 Medications


  (Trade)  Dose


 Ordered  Sig/Tesfaye


 Route


 PRN Reason  Start Time


 Stop Time Status Last Admin


Dose Admin


 


 Prochlorperazine


 Edisylate


  (Compazine)  10 mg  STAT  STAT


 IV


   3/20/21 01:03


 3/20/21 04:54 DC 3/20/21 01:30





 


 Diphenhydramine


 HCl


  (Benadryl)  25 mg  STAT  STAT


 IV


   3/20/21 01:03


 3/20/21 04:54 DC 3/20/21 01:29





 


 Methylprednisolone


 Sodium Succinate


  (Solu-Medrol)  125 mg  STAT  STAT


 IV


   3/20/21 02:14


 3/20/21 02:20 DC 3/20/21 02:52





 


 Methylprednisolone


 Sodium Succinate


  (Solu-Medrol)  125 mg  STK-MED ONCE


 .ROUTE


   3/20/21 02:29


 3/20/21 02:29 DC  





 


 Enoxaparin Sodium


  (Lovenox)  100 mg  STAT  STAT


 SQ


   3/20/21 03:37


 3/20/21 04:54 DC 3/20/21 03:58














Exam


Vital Signs


On examination


Vital signs see HPI


A 60-year-old obese female is in good spirits, resting comfortably in bed at the

 moment.


General appearance: No apparent distress


Neck nontender full range of motion supple


Respiratory system: Tenderness over the left lower chest.  Crackles heard at the

 left base no wheezes


Cardiovascular system: Heart regular rate and rhythm no murmurs rubs or gallops


GI: Normal bowel sounds no hepatosplenomegaly.  Tenderness in the bilateral 

costovertebral angles as well as bilateral lower quadrants of the abdomen 

anteriorly


Extremities no cyanosis clubbing edema,


Musculoskeletal examination does not reveal any inflamed joints, normal range of

 motion no bony deformity


Neurologic examination: Cranial nerves II through XII intact.  Strength is 5 out

 of 5 bilateral upper and lower extremity, sensation intact bilaterally ,


patient is alert and oriented times place and person


Skin examination does not reveal any lesions





Assessment/Plan


Assessment/Plan


Patient History:  


Asthma


  32 MOTHER, , Age:61


  G8 BROTHER, Age:50


  19 CHILD, Age:32


  19 CHILD, Age:29


Asthma


  32 MOTHER, , Age:61


  G8 BROTHER, Age:50


  19 CHILD, Age:32


  19 CHILD, Age:29


Bone cancer


  G8 SISTER, 


Cerebrovascular disorder


  G8 BROTHER, Age:57


Chronic obstructive pulmonary disease


  G8 BROTHER, Age:56


  G8 SISTER, 


FH: lung cancer


  G8 SISTER, 


FHx: brain cancer


  G8 SISTER, 


Hypertension


  33 FATHER, , Age:72


  G8 BROTHER, Age:57


  G8 BROTHER, Age:56


  G8 BROTHER, Age:55


  G8 BROTHER, Age:50


  G8 SISTER, Age:54


  19 CHILD, Age:29


  G8 SISTER, 


Hypertension


  33 FATHER, , Age:72


  G8 BROTHER, Age:57


  G8 BROTHER, Age:56


  G8 BROTHER, Age:55


  G8 BROTHER, Age:50


  G8 SISTER, Age:54


  19 CHILD, Age:29


  G8 SISTER, 





No Family History of:


  Alzheimer's disease


  Cerebrovascular disorder


  Chronic obstructive pulmonary disease


  Congestive heart failure


  Diabetes insipidus


  Diabetes mellitus


  Parkinson's disease


Plan


60-year-old female with multiple medical problems


Migraine attack: Had received Benadryl, Compazine, her headache seems to be much

 better.  I will start the patient on Fioricet.





Patient with pulmonary embolism, as received 1 dose of Lovenox in the ER.  

Concern fact that she has been coughing up brownish to red color phlegm.


This evening and I'll could have predisposed to intramuscular thrombosis.


We may have to reach out to patient's oncologist for further directions in his 

management.


History of hemoptysis this could be secondary to a pulmonary embolism itself.  

We'll monitor for further hemoptysis, instructed the patient to collect her 

sputum and a cup to show it to the morning hospitalist.  Have not ordered 

another dose of Lovenox yet.


Patient with febrile illness cough in the setting of being on steroids as well 

as immunomodulating drugs I will empirically start patient on antibiotics as 

patient is immunosuppressed.


Abdominal discomfort as well as a history of nephrolithiasis she has bilateral 

flank pain we'll order a noncontrasted CT of the abdomen for the morning.


Hypertension stable continue with her home regimen of losartan, Norvasc, Coreg 

and prazosin


Diabetes mellitus , check a hemoglobin A1c in the morning only metformin in 

light of receiving IV contrast this morning


Hypercholesterolemia stable continue with phenotypic related Crestor


Diabetes related neuropathy continue Neurontin


Depression stable continue with Cymbalta and bupropion


Anxiety continue with her regular dose of Xanax


History of gout stable continue with allopurinol


Chronic hypoxemic respiratory failure continue with supplemental O2


Obstructive sleep apnea asked the family to bring BiPAP from home that she can 

use in the hospital


History of heart failure not sure if it's systolic diastolic check an 

echocardiogram, continue with Lasix and potassium


Hypothyroidism stable continue with levothyroxine


Irritable bowel syndrome/constipation continue with her regular dose of Linzess


History of CML resume her regular dose of Gleevac do not know the dose, the 

family will bring the medication over


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


History of chronic back pain and sciatica continue with the muscle relaxant 

tizanidine


DVT prophylaxis Lovenox for now, will need to make disposition in am regarding 

continuing it.


I foresee patient being in the hospital for more than 48 hours so I will be 

admitting the patient as inpatient.  I will be signing out-the care of the 

patient  to the  day hospitalist





Problem Qualifiers





(1) Migraine headache:  


Migraine type:  unspecified  Status migrainosus presence:  without status 

migrainosus  Intractability:  not intractable  Qualified Codes:  G43.909 - 

Migraine, unspecified, not intractable, without status migrainosus


(2) Migraine:  


Migraine type:  unspecified  Status migrainosus presence:  without status 

migrainosus  Intractability:  not intractable  Qualified Codes:  G43.909 - 

Migraine, unspecified, not intractable, without status migrainosus








RASHIDA HERZOG MD               Mar 20, 2021 06:34

## 2021-03-20 NOTE — NUR
STATUS

RESTING QUIETLY IN BED. SPOUSE LAYING IN BED WITH PT. REPORTS SL HEADACHE. DR WINKLER 
PRESENT IN ROOM DISCUSSING POC WITH PT. QUESTIONS WERE ANSWERED BY

## 2021-03-20 NOTE — NUR
20 GA L LATERAL UPPER ARM X 1 ATTEMPT.  BLOOD CULTURES X 2 DRAWN.  PT AVI WELL. 
 IV POSITIONAL, + BLOOD RETURN, FLUSHED WITH 10 ML'S NS.

## 2021-03-20 NOTE — ER.PDOC
General


Chief Complaint:  General Complaint


Stated Complaint:  MIGRAINE,R KIDNEY PAIN


TRAVEL OUT OF US:  No


Time seen by MD:  00:58


Source:  patient, family


Exam Limitations:  no limitations





History of Present Illness


Initial Comments


Patient is a 60-year-old woman who presents with a chief complaint of migraine 

headache for the past 4 days.  Patient reports she has a migraine headache that 

started behind her right eye and has been constant, nonradiating, unrelieved 

with Percogesic that she takes at home.  Patient states she has a history of 

migraines and is similar to her previous migraines.  She denies any trauma.  She

denies blurry vision or double vision.  Patient denies any difficulty with 

speech or swallowing.  Patient denies any focal numbness or weakness of the arms

or legs.  Patient states she has a temperature of 101 at home.  Patient reports 

cough productive of bloody and brown sputum.  Patient reports pain over the left

lateral aspect of her ribs that is sharp, localized, worse when she coughs.  She

denies any other chest pain.  She denies feeling short of breath.  She denies 

any nasal congestion or sore throat.  She states she has had nausea with some 

vomiting.  No diarrhea.She denies any black or bloody stools.  Patient reports 

dysuria, frequency, hematuria.  Patient denies any flank pain.  Patient states 

she has had a complete hysterectomy.  Patient states she was diagnosed with 

"yeast infection" in her bloodstream and she states she was transferred to [facility] for treatment at that time.  Patient also reports she has a history of 

CML.  Patient states she cannot take the flu shot because she is allergic to it.

 Patient states she has not gotten the Covid vaccine.


Timing/Duration:  getting worse


Severity:  moderate


Modifying Factors:  improves with movement


Associated Symptoms:  chest pain, cough, fever/chills, headaches, 

nausea/vomiting


Allergies:  


Coded Allergies:  


     Penicillins (Verified  Allergy, Severe, unknown, 1/24/17)


   "CONVULSIONS"


     aspirin (Verified  Allergy, Severe, 1/24/17)


   "CONVULSIONS"


     cefamandole nafate (Verified  Allergy, Severe, 1/24/17)


     iodine (Verified  Allergy, Severe, Shortness of Breath, 1/24/17)


     quetiapine (Verified  Allergy, Severe, "I GO CRAZY, OUT OF MY HEAD", 

1/24/17)


     Cephalexin Monohydrate (Verified  Allergy, Intermediate, Hives, 1/24/17)


     egg (Verified  Allergy, Intermediate, Headache, 1/24/17)


     latex (Verified  Allergy, Intermediate, HIVES, 1/24/17)


     hydrocodone bitartrate (Verified  Allergy, Unknown, 1/24/17)


     phenazopyridine HCl (Verified  Allergy, Unknown, 1/24/17)


     codeine (Verified  Adverse Reaction, Mild, 1/24/17)


   "I GO CRAZY"


Uncoded Allergies:  


     SULFA (Allergy, Unknown, 5/28/14)


Home Meds


Active Scripts


[Flavoxate]   No Conflict Check, 100 MG PO TID for BLADDER SPASM, #30


   Prov:DAVY ROSENTHAL MD         1/20/21


Oxcarbazepine (OXCARBAZEPINE) 150 Mg Tablet, 300 MG PO DAILY for 30 Days, TAB


   Prov:DWAIN WINKLER MD         1/10/20


Losartan Potassium (COZAAR) 50 Mg Tablet, 100 MG PO DAILY for 30 Days, #30 TAB


   Prov:DWAIN WINKLER MD         1/10/20


Prednisone (PREDNISONE) 20 Mg Tablet, 40 MG PO DAILY24 for 30 Days


   Prov:DWAIN WINKLER MD         12/5/19


Pantoprazole Sodium (PROTONIX) 40 Mg Tablet.dr, 40 MG PO DAILY for 30 Days, #30


   Prov:DWAIN WINKLER MD         12/5/19


Lancets (ACCU-CHEK) 1 Each Each, 1 EACH MC Q6 for 30 Days, #30 EACH


   Prov:DWAIN WINKLER MD         7/12/19


Amlodipine Besylate (NORVASC) 5 Mg Tablet, 5 MG PO DAILY for 30 Days, TABLET


   Prov:DWAIN WINKLER MD         7/12/19


Reported Medications


Carvedilol 25MG (COREG 25MG) 25 Mg Tablet, 1 TAB PO BID, #60 TAB 5 Refills


   12/3/19


Tramadol Hcl (TRAMADOL HCL) 50 Mg Tablet, 50 MG PO PRN PRN for PAIN, TABLET


   12/2/19


Metformin Hcl (METFORMIN HCL) 500 Mg Tablet, 1 TAB PO BID, #60 TAB 3 Refills


   12/2/19


Rosuvastatin 10MG (CRESTOR 10MG) 10 Mg Tablet, 1 TAB PO DAILY, #30 TAB 5 Refills


   12/2/19


Fenofibrate (FENOFIBRATE) 160 Mg Tablet, 160 MG PO DAILY24, TABLET


   7/10/19


Potassium Citrate (POTASSIUM CITRATE) 10 Meq Tablet.er, 10 MEQ PO DAILY24


   8/6/18


Bupropion Hcl (BUPROPION XL) 300 Mg Tab.er.24h, 300 MG PO DAILY24


   8/6/18


Ondansetron (ZOFRAN ODT) 8 Mg Tab.rapdis, 8 MG PO DAILY24 for N/V, TAB


   8/6/18


Albuterol Sulfate (VENTOLIN HFA) 18 Gm Hfa.aer.ad, 90 MCG IH Q6HR PRN for 

SHORTNESS OF BREATH


   8/6/18


Allopurinol (ALLOPURINOL) 300 Mg Tablet, 1 TAB PO DAILY


   8/6/18


Duloxetine Hcl (CYMBALTA) 60 Mg Capsule.dr, 60 MG PO DAILY


   1/24/17


Tiotropium Bromide (SPIRIVA) 18 Mcg Cap.w.dev, 1 CAP IH DAILY


   10/14/16


Furosemide (LASIX) 20 Mg Tablet, 1 TAB PO DAILY


   9/24/15


Tizanidine Hcl (TIZANIDINE HCL) 4 Mg Tablet, 1 TAB PO TID PRN for MUSCLE SPASM


   9/24/15


Gabapentin (NEURONTIN) 300 Mg Capsule, 2 CAP PO TID


   11/21/14


Alprazolam (XANAX) 2 Mg Tablet, 2 MG PO TID, TABLET


   11/17/14


Linaclotide (LINZESS) 290 Mcg Capsule, 290 MCG PO DAILY, CAPSULE


   11/17/14


Cholecalciferol (Vitamin D3) (VITAMIN D) 10,000 Unit Capsule, 64299 UNIT PO 3 

TIMES A WEEK, CAPSULE


   11/17/14


Levothyroxine Sodium (LEVOTHYROXINE SODIUM) 200 Mcg Tablet, 150 MCG PO DAILY, 

TABLET


   6/13/14


Discontinued Reported Medications


Clonidine Hcl (CLONIDINE HCL) 0.2 Mg Tablet, 1 TAB PO HS, #30 TAB 2 Refills


   12/2/19


Dasatinib (SPRYCEL) 100 Mg Tablet, 90 MG PO DAILY24, TABLET


   8/6/18


Dasatinib (SPRYCEL) 80 Mg Tablet, 90 MG PO DAILY24, TABLET


   8/6/18





Past Medical History


Medical History:  cancer, COPD, fibromyalgia, high cholesterol, hypertension, 

thyroid disease


Surgical History:  appendectomy, cholecystectomy, hysterectomy, tonsillectomy


LMP (females 10-50):  hysterectomy





Family History


Significant Family History:  other (Migraines in her father)





Social History


Smoking:  non-smoker


Alcohol Use:  none


Drug Use:  none





Review of Systems


Constitutional:  chills, fever, weakness


EENTM:  denies blurred vision, denies double vision, denies nose congestion, 

denies throat pain


Respiratory:  cough; denies shortness of breath


Cardiovascular:  chest pain


Gastrointestinal:  denies abdominal pain, denies diarrhea; nausea; denies 

vomiting


Genitourinary:  dysuria, frequency, hematuria


Musculoskeletal:  denies back pain, denies neck pain


Skin:  denies rash


Psychiatric/Neurological:  headache; denies numbness, denies paresthesia, denies

weakness


Hematologic/Lymphatic:  denies easy bleeding


All Other Systems:  Reviewed and Negative





Physical Exam


General Appearance:  No Apparent Distress, WD/WN, Obese


EENT:  eyes nml inspection, nml ENT inspection


Neck:  Non-Tender, Full Range of Motion, Supple, Normal Inspection


Respiratory:  chest non-tender, no respiratory distress, no accessory muscle 

use, crackles (Left base)


CVS:  reg rate & rhythm, no murmur, no gallop, pulses nml, nml capillary refill


Gastrointestinal:  Normal Bowel Sounds, No Organomegaly, Non Tender


Back:  Normal Inspection, No CVA Tenderness, No Vertebral Tenderness


Extremities:  Normal Range of Motion, Non-Tender, Normal Inspection, No Pedal 

Edema, No Calf Tenderness, Normal Capillary Refill


Neurologic/Psychiatric:  CNs II-XII NML as Tested, No Motor/Sensory Deficits, 

Alert, Normal Mood/Affect, Oriented x 3


Skin:  Normal Color, Warm/Dry





Results/Orders


Results/Orders





Orders - TYSHAWN ESPINOZA MD


Cbc With Auto Diff (3/20/21 01:03)


Comprehensive Metabolic Panel (3/20/21 01:03)


Lactic Acid(Ml) (3/20/21 01:03)


Urinalysis (3/20/21 01:03)


Blood Culture (3/20/21 01:03)


Xr Chest 2v (3/20/21 01:03)


Saline Lock (3/20/21 01:03)


Troponin I (3/20/21 01:03)


D-Dimer (3/20/21 01:03)


Ekg-Routine (3/20/21 01:03)


Prochlorperazine Edisylate (Compazine) (3/20/21 01:03)


Diphenhydramine Hcl (Benadryl) (3/20/21 01:03)


Cta Chest (3/20/21 02:14)


Methylprednisolone Sod Succ (Solu-Medrol (3/20/21 02:14)


Covid19 Antigen Sophia Joanna (3/20/21 02:24)


Methylprednisolone Sod Succ (Solu-Medrol (3/20/21 02:29)


Influenza A&B (3/20/21 02:53)


Enoxaparin Sodium (Lovenox) (3/20/21 03:37)


Troponin I (3/20/21 03:39)


Procalcitonin (3/20/21 03:51)





Vital Signs








  Date Time  Temp Pulse Resp B/P (MAP) Pulse Ox O2 Delivery O2 Flow Rate FiO2


 


3/20/21 03:17  65 18 99/42 (61) 100 Nasal Canula 2.00 


 


3/20/21 02:00  71 18 103/81 (88) 100 Nasal Canula 2.00 


 


3/20/21 01:45  92 18 101/41 (61) 99 Nasal Canula 2.00 


 


3/20/21 01:00 98.2 104 18 140/61 (87) 96 Nasal Canula 2.00 


 


3/20/21 01:00 98.2 104 18     


 


3/20/21 01:00 98.2 104 18  96   








Administered Medications








 Medications


  (Trade)  Dose


 Ordered  Sig/Tesfaye


 Route


 PRN Reason  Start Time


 Stop Time Status Last Admin


Dose Admin


 


 Diphenhydramine


 HCl


  (Benadryl)  25 mg  STAT  STAT


 IV


   3/20/21 01:03


 3/20/21 01:04 UNV 3/20/21 01:29


25 MG


 


 Methylprednisolone


 Sodium Succinate


  (Solu-Medrol)  125 mg  STAT  STAT


 IV


   3/20/21 02:14


 3/20/21 02:20 DC 3/20/21 02:52


125 MG


 


 Prochlorperazine


 Edisylate


  (Compazine)  10 mg  STAT  STAT


 IV


   3/20/21 01:03


 3/20/21 01:04 UNV 3/20/21 01:30


10 MG








                                Laboratory Tests








Test


 3/20/21


00:40 3/20/21


01:20 3/20/21


03:05 3/20/21


03:51


 


Urine Collection Type CCMS     


 


Urine Color


 YELLOW


(YELLOW) 


 


 





 


Urine Appearance CLEAR (CLEAR)     


 


Urine Bilirubin


 NEGATIVE MG/DL


(NEGATIVE) 


 


 





 


Urine Ketones


 NEGATIVE


(NEGATIVE) 


 


 





 


Urine Specific Gravity


 1.015


(1.005-1.035) 


 


 





 


Urine pH 6.0 (5.0-6.0)     


 


Urine Protein


 NEGATIVE


(NEGATIVE) 


 


 





 


Urine Urobilinogen


 NEGATIVE


(NEGATIVE) 


 


 





 


Urine Nitrate


 NEGATIVE


(NEGATIVE) 


 


 





 


Urine Leukocyte Esterase


 NEGATIVE


(NEGATIVE) 


 


 





 


Urine Blood


 NEGATIVE


(NEGATIVE) 


 


 





 


Urine Glucose


 NEGATIVE


(NEGATIVE) 


 


 





 


White Blood Count


 


 5.6 10^3/uL


(4.5-11.0) 


 





 


Red Blood Count


 


 2.92 10^6/uL


(4.00-5.20)  L 


 





 


Hemoglobin


 


 8.9 g/dL


(12.0-15.0)  L 


 





 


Hematocrit


 


 27.9 %


(36.0-46.0)  L 


 





 


Mean Corpuscular Volume


 


 95.5 fL


() 


 





 


Mean Corpuscular Hemoglobin


 


 30.5 pg


(26-34) 


 





 


Mean Corpuscular Hemoglobin


Concent 


 31.9 g/dL


(33-36.5)  L 


 





 


Red Cell Distribution Width


 


 15.9 %


(11.5-14.5)  H 


 





 


Platelet Count


 


 203 10^3/uL


(150-400) 


 





 


Mean Platelet Volume


 


 9.3 fL


(7.8-11.0) 


 





 


Neutrophils (%) (Auto)


 


 71.3 %


(41.0-85.0) 


 





 


Lymphocytes (%) (Auto)


 


 19.8 %


(24.0-44.0)  L 


 





 


Monocytes (%) (Auto)


 


 5.7 %


(5.0-12.0) 


 





 


Neutrophils # (Auto)


 


 4.0 10^3/uL


(1.8-7.7) 


 





 


Lymphocytes # (Auto)


 


 1.11 10^3/uL1


(1.0-4.8) 


 





 


Monocytes # (Auto)


 


 0.3 10^3/uL


(0.3-0.8) 


 





 


Absolute Immature Granulocyte


(auto 


 0 10^3 u/L


(0-2) 


 





 


Absolute Eosinophils (auto)


 


 0.2 10^3/uL


(0.0-0.2) 


 





 


Immature Granulocytes %


 


 0.00 %


(0.00-0.50) 


 





 


Eosinophils %


 


 2.8 %


(0.0-5.0) 


 





 


Basophils %


 


 0.4 %


(0.0-0.2)  H 


 





 


Basophils #


 


 0.0 10^3/uL


(0.0-0.1) 


 





 


D-Dimer


 


 1.20 mg/L


(0.19-0.49)  *H 


 





 


Sodium Level


 


 142 mmol/L


(132-145) 


 





 


Potassium Level


 


 4.0 mmol/L


(3.6-5.2) 


 





 


Chloride Level


 


 105.0 mmol/L


() 


 





 


Carbon Dioxide Level


 


 30.7 mmol/L


(20.0-32) 


 





 


Anion Gap  10.3    


 


Blood Urea Nitrogen


 


 13 mg/dL


(7-18) 


 





 


Creatinine


 


 1.40 mg/dL


(0.59-1.40) 


 





 


Estimated GFR (


American) 


 46.7 (>/=60)  


 


 





 


Est GFR (CKD-EPI)(Non-Afr


American) 


 38.6 (>/=60)  


 


 





 


BUN/Creatinine Ratio  9.0    


 


Glucose Level


 


 175 mg/dL


()  H 


 





 


Lactic Acid Level


 


 1.7 mmol/L


(0.5-1.9) 


 





 


Calcium Level


 


 8.6 mg/dL


(8.4-10.5) 


 





 


Total Bilirubin


 


 0.2 mg/dL


(0.2-1.0) 


 





 


Aspartate Amino Transferase


(AST) 


 15 U/L (0-35)  


 


 





 


Alanine Aminotransferase (ALT)


 


 18 U/L (12-78)


 


 





 


Alkaline Phosphatase


 


 38 U/L


()  L 


 





 


Troponin I


 


 < 0.02 ng/mL


(0.00-0.05) 


 < 0.02 ng/mL


(0.00-0.05)


 


Total Protein


 


 6.8 g/dL


(6.4-8.2) 


 





 


Albumin


 


 3.0 g/dL


(3.4-5.0)  L 


 





 


Globulin  3.8    


 


Albumin/Globulin Ratio  0.789    


 


Influenza Type A Antigen


 


 


 NEGATIVE (NEG)


 





 


Influenza B Immunofluorescence


 


 


 NEGATIVE (NEG)


 





 


SARS-CoV-2 Antigen (Rapid)


 


 


 NEGATIVE


(NEGATIVE) 














Progress


Progress


Patient had an EKG done which shows sinus rhythm, incomplete right Bundle branch

block with T wave inversions in V1 through V4.  This EKG is unchanged compared 

to an EKG that was done in January 21, 2021.  Patient's initial troponin was 

negative.  Patient's initial lactic acid was negative at 1.7.  Patient had a CBC

that shows normal white count of 5.6 with normal differential.  Patient's 

hemoglobin was 8.9 with hematocrit 27.9 which is actually improved compared to 

previously.  Her platelet count was normal.  Patient CMP was unremarkable other 

than a glucose of 175.  Patient's D-dimer was elevated at 1.2.  Patient had a 

chest x-ray that shows questionable left lower lobe infiltrate with no other 

acute findings per the radiologist.  I did review the patient's chest x-ray 

images.Patient is UA was negative.  Patient had Covid and flu that were both 

negative.  Patient received Compazine 10 mg IV and Benadryl 25 mg IV with 

improvement in her headache.  Due to the patient's chest pain, elevated D-dimer,

history of chronic leukemia concerning for pulmonary embolism patient underwent 

CTA of her chest.  Patient has reported dye allergy and was given Solu-Medrol 

125 mg IV in addition to the Benadryl she had already received.  Patient had her

CTA and tolerated the procedure well without difficulties.  CTA of the chest 

shows a right lower lobe pulmonary embolism with moderate size left pleural 

effusion.  No other acute changes per the radiologist.  Due to the patient's 

pulmonary embolism she was given Lovenox 1 mg/kg subcu.  Patient's Covid and flu

were both negative.  Impression patient has pulmonary embolism, fever, chest 

pain, and will need to be admitted for further evaluation and treatment.  At 

0350 spoke to Dr. Mac Mayo who is the hospitalist on-call the day who agrees 

to accept patient for admission.  I have placed the admission order and the 

patient will be admitted to the floor for further evaluation and treatment.





EKG/XRAY/CT/US


EKG:  NSR, RBBB, nonspecific ST T wave chg, unchanged from (January 21, 2021)





ER DEPART


Departure


Time of Disposition:  03:59


Disposition:  09 ADMITTED AS INPATIENT


Impression:  


   Primary Impression:  


   Pulmonary embolism


   Additional Impressions:  


   Chest pain


   Fever


   Migraine headache


Condition:  Stable


Referrals:  


MARISABEL CONTEH MD (PCP)


PRIMARY CARE PROVIDER








RASHIDA HERZOG MD





Additional Instructions:  


Stable


Comments


At 0350 I spoke to Dr. Herzog, the hospitalist, who agrees to accept patient for

admission.


Duration or Time Spent with Pa:  40 minutes





Return to Work/School


Can a patient return to work?:  No


Can a patient return to school:  No





Problem Qualifiers








   Primary Impression:  


   Pulmonary embolism


   Pulmonary embolism type:  single subsegmental (without acute cor pulmonale)  

   Qualified Codes:  I26.93 - Single subsegmental pulmonary embolism without 

   acute cor pulmonale


   Additional Impressions:  


   Chest pain


   Chest pain type:  unspecified  Qualified Codes:  R07.9 - Chest pain, 

   unspecified


   Fever


   Fever type:  unspecified  Qualified Codes:  R50.9 - Fever, unspecified


   Migraine headache


   Migraine type:  unspecified  Status migrainosus presence:  without status 

   migrainosus  Intractability:  not intractable  Qualified Codes:  G43.909 - 

   Migraine, unspecified, not intractable, without status migrainosus








TYSHAWN ESPINOZA MD          Mar 20, 2021 01:22

## 2021-03-20 NOTE — NUR
COREG NON ADMIN



MORNING DOSE OF COREG WAS GIVEN LATE, MAKING THE SCHEDULE OFF FOR THE EVENING DOSE WILL SKIP 
EVENING DOSE AND GET BACK ON SCHEDULE IN THE AM

## 2021-03-21 VITALS — SYSTOLIC BLOOD PRESSURE: 163 MMHG | DIASTOLIC BLOOD PRESSURE: 72 MMHG

## 2021-03-21 VITALS — DIASTOLIC BLOOD PRESSURE: 73 MMHG | SYSTOLIC BLOOD PRESSURE: 146 MMHG

## 2021-03-21 VITALS — SYSTOLIC BLOOD PRESSURE: 136 MMHG | DIASTOLIC BLOOD PRESSURE: 59 MMHG

## 2021-03-21 VITALS — SYSTOLIC BLOOD PRESSURE: 148 MMHG | DIASTOLIC BLOOD PRESSURE: 77 MMHG

## 2021-03-21 VITALS — DIASTOLIC BLOOD PRESSURE: 86 MMHG | SYSTOLIC BLOOD PRESSURE: 158 MMHG

## 2021-03-21 VITALS — DIASTOLIC BLOOD PRESSURE: 61 MMHG | SYSTOLIC BLOOD PRESSURE: 137 MMHG

## 2021-03-21 LAB
ALP INTEST CFR SERPL: 42 U/L (ref 50–136)
ALT SERPL-CCNC: 19 U/L (ref 12–78)
AST SERPL-CCNC: 16 U/L (ref 0–35)
BASOPHILS # BLD AUTO: 0 10^3/UL (ref 0–0.1)
BASOPHILS NFR BLD AUTO: 0.4 % (ref 0–0.2)
CALCIUM SERPL-MCNC: 9.3 MG/DL (ref 8.4–10.5)
CO2 BLDA-SCNC: 28 MMOL/L (ref 20–32)
EOSINOPHIL # BLD AUTO: 0.2 10^3/UL (ref 0–0.2)
EOSINOPHIL NFR BLD AUTO: 3.1 % (ref 0–5)
ERYTHROCYTE [DISTWIDTH] IN BLOOD BY AUTOMATED COUNT: 16 % (ref 11.5–14.5)
GLUCOSE PRE 100 G GLC PO SERPL-MCNC: 193 MG/DL (ref 70–110)
LYMPHOCYTES # BLD AUTO: 1.37 10^3/UL1 (ref 1–4.8)
LYMPHOCYTES NFR BLD AUTO: 20.3 % (ref 24–44)
MCH RBC QN AUTO: 30.2 PG (ref 26–34)
MONOCYTES # BLD AUTO: 0.6 10^3/UL (ref 0.3–0.8)
MONOCYTES NFR BLD AUTO: 8.2 % (ref 5–12)
NEUTROPHILS # BLD AUTO: 4.6 10^3/UL (ref 1.8–7.7)
NEUTROPHILS NFR BLD AUTO: 67.6 % (ref 41–85)
PLATELET # BLD AUTO: 264 10^3/UL (ref 150–400)

## 2021-03-21 RX ADMIN — AMLODIPINE BESYLATE SCH MG: 5 TABLET ORAL at 08:46

## 2021-03-21 RX ADMIN — Medication SCH UNIT: at 07:30

## 2021-03-21 RX ADMIN — Medication SCH UNIT: at 11:30

## 2021-03-21 RX ADMIN — DULOXETINE HYDROCHLORIDE SCH MG: 30 CAPSULE, DELAYED RELEASE ORAL at 08:43

## 2021-03-21 RX ADMIN — OXCARBAZEPINE SCH MG: 150 TABLET, FILM COATED ORAL at 08:43

## 2021-03-21 RX ADMIN — MORPHINE SULFATE PRN MG: 4 INJECTION, SOLUTION INTRAMUSCULAR; INTRAVENOUS at 07:49

## 2021-03-21 RX ADMIN — GABAPENTIN SCH MG: 300 CAPSULE ORAL at 14:36

## 2021-03-21 RX ADMIN — FENOFIBRATE SCH MG: 54 TABLET ORAL at 08:56

## 2021-03-21 RX ADMIN — CARVEDILOL SCH MG: 12.5 TABLET, FILM COATED ORAL at 20:17

## 2021-03-21 RX ADMIN — FLAVOXATE HYDROCHLORIDE SCH MG: 100 TABLET, FILM COATED ORAL at 20:17

## 2021-03-21 RX ADMIN — GABAPENTIN SCH MG: 300 CAPSULE ORAL at 20:17

## 2021-03-21 RX ADMIN — Medication SCH UNIT: at 20:18

## 2021-03-21 RX ADMIN — GABAPENTIN SCH MG: 300 CAPSULE ORAL at 08:45

## 2021-03-21 RX ADMIN — MORPHINE SULFATE PRN MG: 4 INJECTION, SOLUTION INTRAMUSCULAR; INTRAVENOUS at 00:08

## 2021-03-21 RX ADMIN — ALLOPURINOL SCH MG: 300 TABLET ORAL at 08:46

## 2021-03-21 RX ADMIN — LOSARTAN POTASSIUM SCH MG: 50 TABLET, FILM COATED ORAL at 08:45

## 2021-03-21 RX ADMIN — LEVOTHYROXINE SODIUM SCH MCG: 75 TABLET ORAL at 06:35

## 2021-03-21 RX ADMIN — FLAVOXATE HYDROCHLORIDE SCH MG: 100 TABLET, FILM COATED ORAL at 08:46

## 2021-03-21 RX ADMIN — PANTOPRAZOLE SODIUM SCH MG: 40 TABLET, DELAYED RELEASE ORAL at 08:46

## 2021-03-21 RX ADMIN — Medication SCH UNIT: at 17:39

## 2021-03-21 RX ADMIN — ENOXAPARIN SODIUM SCH MG: 40 INJECTION SUBCUTANEOUS at 08:43

## 2021-03-21 RX ADMIN — ROSUVASTATIN CALCIUM SCH MG: 10 TABLET, FILM COATED ORAL at 20:19

## 2021-03-21 RX ADMIN — Medication SCH MG: at 08:43

## 2021-03-21 RX ADMIN — PREDNISONE SCH MG: 20 TABLET ORAL at 08:56

## 2021-03-21 RX ADMIN — FLAVOXATE HYDROCHLORIDE SCH MG: 100 TABLET, FILM COATED ORAL at 14:36

## 2021-03-21 RX ADMIN — FUROSEMIDE SCH MG: 20 TABLET ORAL at 08:46

## 2021-03-21 RX ADMIN — POTASSIUM BICARBONATE SCH MEQ: 391 TABLET, EFFERVESCENT ORAL at 08:43

## 2021-03-21 RX ADMIN — LEVOFLOXACIN SCH MLS/HR: 5 INJECTION, SOLUTION INTRAVENOUS at 08:43

## 2021-03-21 RX ADMIN — CARVEDILOL SCH MG: 12.5 TABLET, FILM COATED ORAL at 08:44

## 2021-03-21 NOTE — PNH
DATE:  03/20/2021



A 60-year-old female admitted early morning on 03/20/2021 by the hospitalist and

I saw her on the morning of 03/20/2021.



CHIEF COMPLAINT:  Had a migraine headache post-nephrolithiasis with candidemia,

had to be transferred to [place] and at the present time, she came in with

hemoptysis and left lower ribcage pain.



HISTORY OF PRESENT ILLNESS:  The patient is a 60-year-old white female who has

underlying history of chronic myeloid leukemia on Gleevec, COPD, AZAR on CPAP and

hypertension, hypertensive heart disease, chronic diastolic heart failure, CKD

class 2, prior history of glomerulonephritis, nephrolithiasis, recurrent

pulmonary infection, pulmonary hemorrhage and she was admitted around 4:00 in

the morning of 03/20/2021.  She came with [] hemoptysis and sputum production

and lower ribcage pain, worse on coughing and she was not having any unusual

shortness of breath, was not very hypoxic.  EKG was showing regular sinus rhythm

with right bundle branch block and nonspecific ST-T wave changes.  Chest x-ray

showed possible left lower lobe infiltrate.  CT chest was not very conclusive,

but was interpreted on the right lower lobe pulmonary embolism and left pleural

effusion [] hemoptysis.  I am not really sure whether she had pulmonary emboli. 

She got 1 dose of Lovenox in the Emergency Room.



ALLERGIES:  CEPHALEXIN, PENICILLIN, SULFA, ASPIRIN, CEFAMANDOLE, CODEINE, EGGS,

HYDROCODONE, IODINE, LATEX.



MEDICATIONS:  She is on a long list of medications, which include [] albuterol

nebulizer on a p.r.n. basis, allopurinol 300 mg once a day, Xanax 2 mg 3 times a

day, amlodipine 5 mg once a day, bupropion  mg once a day, Coreg 25 mg

twice a day, Cymbalta 60 mg once a day, fenofibrate 160 mg once a day, Lasix 20

mg once a day, gabapentin 300 mg 2 capsules 3 times a day, Levothroid 200 mcg

once a day, losartan 100 mg once a day and ondansetron 8 mg on p.r.n. basis for

nausea and vomiting, oxcarbazepine 300 mg once a day, Protonix 40 mg once a day,

potassium 10 mEq once a day, prednisone 40 mg once a day, Crestor 10 mg once a

day, Spiriva 18 mcg once a day, flavoxate 100 mg 3 times a day for bladder

spasms.  She is on several other p.r.n. medicines and metformin 500 mg twice a

day.



PAST MEDICAL HISTORY:  CML, nephrolithiasis, recurrent pulmonary infection, AZAR

on CPAP, hypertension, hypertensive heart disease and she has had multiple

pneumonias in the past.  See my past history for further details.



SOCIAL HISTORY:  Nonsmoker, no ethanol abuse.



FAMILY HISTORY:  Positive for lung problems and heart disease.



PHYSICAL EXAMINATION:

GENERAL:  When I saw her, she was comfortable.

VITAL SIGNS:  She is 170 cm and 99 kilograms, BMI 34.5.  She has lost some

weight.  Temperature 98, respirations were 18-20, pulse 81 [].

HEENT:  Unremarkable.

NECK:  No JVD, no carotid bruits.

LUNGS:  Showed poor air entry in the lung bases.  She appeared to be

significantly pale.  Decreased air entry in both lung bases.

HEART:  Sounds S1, S2 normal.

ABDOMEN:  Rounded, no organomegaly.

EXTREMITIES:  Distal pulses poorly felt and minimal dependent edema.

NEUROLOGIC:  No focal neuro deficit is documented.



LABORATORY DATA:  CBC showing 8.9 hemoglobin, which is low for her, but still

not at a range where she requires a blood transfusion.  Her platelets are

203,000 and her chemistries were all acceptable.  BUN is 13, creatinine 1.4. 

TSH was 1.2, 6.3 A1c, which is acceptable.  D-dimer was 1.2.  Blood gas was not

done.  Urine was unremarkable.  Serology for COVID PCR was negative and her

chest x-ray showed left hemidiaphragm was obscured due to infiltrate or

pneumonia.  She has had these chronic findings, mild cardiomegaly, right lung

base, streaky opacities and there was a CTA [] finding concerning of pulmonary

emboli in the right lower lobe, this is not definite, left pleural effusion,

chronic scarring noted.  She had an abdominal-pelvis CT also and multiple renal

scars in each kidneys, possibility of calculi, post-cholecystectomy,

hysterectomy [] colonic diverticula were noted.



IMPRESSION:  History of cough, hemoptysis, history of prior pulmonary

hemorrhage, has been on longstanding steroid doses, prednisone 40 mg once a day

given by pulmonologist because of pulmonary hemorrhage and I am not very

convinced of pulmonary emboli and one could probably give her prophylactic dose,

but to subject her to full dose of heparin or Lovenox increases the risk of

pulmonary hemorrhage as V/Q scan has been ordered, which will also be difficult

to interpret, but at the present time, her blood gas to see if she has got any

shunting will be done.  Continue present medications, empiric antibiotics and

decide therapy after blood gas and a V/Q scan.





______________________________

Laxmichand MD Denise



DR:  TATE/maria eugenia  JOB# [id number]  [id number]

DD:  03/20/2021 14:03  DT:  03/20/2021 15:56

## 2021-03-21 NOTE — PNH
DATE:  03/21/2021



SUBJECTIVE:  A 60-year-old.  The patient is having hemoptysis.



OBJECTIVE:

VITAL SIGNS:  Her temperature is 99.1 and 88 pulse and respirations 19 and

136/59, blood pressure 94, saturation 1-1/2 liters nasal cannula.  She appears

to be comfortable.

NECK:  No JVD.

LUNGS:  Poor air entry bilaterally.

HEART:  Sounds normal.  Intake, output shows positive balance of 400 mL.



LABORATORY DATA:  Her white count of 6.7.  Chemistry is showing a creatinine of

1.44, which is elevated compared the admission creatinine was also 1.4.  Blood

sugar is acceptable, is slightly elevated 260.  She is on steroids and she has

got a myelocytic leukemia, but the counts are normal.  Platelets are acceptable,

poor at 64,000, she gets hemoptysis, probably due to the chemotherapeutic agent

and she has had severe pulmonary hemorrhage in the past and clinically, the

chance of having pulmonary emboli are very low and CT report was also equivocal,

V/Q scan is awaited tomorrow.  I have not started her on full dose of

anticoagulation taking it is not any acute pulmonary emboli and there is a

contraindication to heparin or Lovenox because of the hemoptysis.  We will await

V/Q scan, IV site access has been lost.



PLAN:  We will change Levaquin to oral and discharge planning by 03/21/2021.





______________________________

Patricia Walker MD



DR:  TATE/maria eugenia  JOB# [id number]  [id number]

DD:  03/21/2021 20:26  DT:  03/21/2021 21:44

## 2021-03-22 VITALS — SYSTOLIC BLOOD PRESSURE: 130 MMHG | DIASTOLIC BLOOD PRESSURE: 74 MMHG

## 2021-03-22 VITALS — DIASTOLIC BLOOD PRESSURE: 74 MMHG | SYSTOLIC BLOOD PRESSURE: 130 MMHG

## 2021-03-22 VITALS — DIASTOLIC BLOOD PRESSURE: 80 MMHG | SYSTOLIC BLOOD PRESSURE: 155 MMHG

## 2021-03-22 VITALS — SYSTOLIC BLOOD PRESSURE: 159 MMHG | DIASTOLIC BLOOD PRESSURE: 81 MMHG

## 2021-03-22 RX ADMIN — ENOXAPARIN SODIUM SCH MG: 40 INJECTION SUBCUTANEOUS at 09:25

## 2021-03-22 RX ADMIN — FLAVOXATE HYDROCHLORIDE SCH MG: 100 TABLET, FILM COATED ORAL at 09:21

## 2021-03-22 RX ADMIN — PREDNISONE SCH MG: 20 TABLET ORAL at 08:00

## 2021-03-22 RX ADMIN — OXCARBAZEPINE SCH MG: 150 TABLET, FILM COATED ORAL at 09:02

## 2021-03-22 RX ADMIN — AMLODIPINE BESYLATE SCH MG: 5 TABLET ORAL at 08:58

## 2021-03-22 RX ADMIN — MORPHINE SULFATE PRN MG: 4 INJECTION, SOLUTION INTRAMUSCULAR; INTRAVENOUS at 03:08

## 2021-03-22 RX ADMIN — DULOXETINE HYDROCHLORIDE SCH MG: 30 CAPSULE, DELAYED RELEASE ORAL at 09:03

## 2021-03-22 RX ADMIN — LOSARTAN POTASSIUM SCH MG: 50 TABLET, FILM COATED ORAL at 09:01

## 2021-03-22 RX ADMIN — GABAPENTIN SCH MG: 300 CAPSULE ORAL at 08:58

## 2021-03-22 RX ADMIN — CARVEDILOL SCH MG: 12.5 TABLET, FILM COATED ORAL at 08:59

## 2021-03-22 RX ADMIN — Medication SCH MG: at 08:58

## 2021-03-22 RX ADMIN — FENOFIBRATE SCH MG: 54 TABLET ORAL at 09:21

## 2021-03-22 RX ADMIN — Medication SCH UNIT: at 11:30

## 2021-03-22 RX ADMIN — ALLOPURINOL SCH MG: 300 TABLET ORAL at 09:02

## 2021-03-22 RX ADMIN — MORPHINE SULFATE PRN MG: 4 INJECTION, SOLUTION INTRAMUSCULAR; INTRAVENOUS at 09:22

## 2021-03-22 RX ADMIN — LEVOTHYROXINE SODIUM SCH MCG: 75 TABLET ORAL at 07:54

## 2021-03-22 RX ADMIN — PANTOPRAZOLE SODIUM SCH MG: 40 TABLET, DELAYED RELEASE ORAL at 09:07

## 2021-03-22 RX ADMIN — Medication SCH UNIT: at 07:30

## 2021-03-22 RX ADMIN — POTASSIUM BICARBONATE SCH MEQ: 391 TABLET, EFFERVESCENT ORAL at 08:58

## 2021-03-22 RX ADMIN — FUROSEMIDE SCH MG: 20 TABLET ORAL at 09:00

## 2021-03-22 NOTE — DSH
DATE OF DISCHARGE:  03/22/2021



FINAL DIAGNOSES:  Hemoptysis, shortness of breath, hypertension, hypertensive

heart disease, recurrent pulmonary infections, chronic myeloid leukemia, chronic

kidney disease class 2, prior history of glomerulonephritis, recurrent

hemoptysis, pulmonary emboli was considered and ruled out by V/Q scan, which was

showing low probability of pulmonary emboli.  Please refer to my history and

physical to the point of my impression.



HOSPITAL COURSE:  The patient is a 60-year-old female with underlying history of

chronic myeloid leukemia, obesity, hypertension, hypertensive heart disease,

chronic renal failure, recurrent pulmonary infections, hemoptysis,

nephrolithiasis and she has had multiple hospitalizations in the last 1 year,

for multiple medical problems and she came in with shortness of breath and CTA

was done and they thought she may have a possibility of emboli, although she had

no tachycardia, tachypnea and no hypoxemia was noted and she was having

hemoptysis, which is not uncommon.  She has been on chemotherapeutic agents,

which may have aggravated her hemoptysis.  At the present time, she had a V/Q

scan done, which showed low probability of pulmonary emboli, although CT showed

a possibility of right lower lobe embolus and since she has no mismatch defects,

we will not put her on any anticoagulation, which are contraindicated in her

related to her history of hemoptysis.  We left her on Levaquin 500 mg daily and

Ventolin inhaler on p.r.n. basis, allopurinol 300 mg once a day, Xanax 2 mg 3

times a day, amlodipine 5 mg once a day, bupropion  mg once a day,

amlodipine 5 mg once a day, Coreg 25 mg twice a day, vitamin D3, 50,000 units 3

times a week and Cymbalta 60 mg once a day, fenofibrate 160 mg once a day,

flavoxate 100 mg 3 times a day, Lasix 20 mg once a day, gabapentin 300 mg 2

capsules 3 times a day and Levothroid 200 mcg once a day, Linzess on p.r.n.

basis and losartan 100 mg once a day, metformin 500 mg twice a day, Zofran on

p.r.n. basis, oxcarbazepine 300 mg daily, Protonix 40 mg once a day, potassium

10 mEq once a day, prednisone 40 mg daily, it will be decided by the

pulmonologist to decrease the dose of prednisone, Crestor 10 mg once a day,

Spiriva 18 mcg nebulizer by inhalation once a day and tizanidine 4 mg 3 times a

day on p.r.n. basis, Ultram 50 mg on p.r.n. basis and she will be seeing her

pulmonologist and Dr. Yusuf.  I do not think there is a need for her to go on

anticoagulation at the present time.





______________________________

Wendymichand MD Denise



DR:  TATE/maria eugenia  JOB# [id number]  [id number]

DD:  03/22/2021 13:14  DT:  03/22/2021 13:27

## 2021-03-22 NOTE — ECHO
DATE OF SERVICE:  



A 60-year-old female with hypertension, hypertensive heart disease, chronic lung

disease, myeloid leukemia, congestive heart failure, diastolic dysfunction,

systolic dysfunction, chronic renal failure, assess LV systolic function.



FINDINGS:  Mitral valve shows mild mitral annular calcification, mitral

regurgitation, 1.2 meters velocity, mild reversal of E to A ratio.  Aorta is

normal, 7 mm gradient across the aorta with adequate aortic valve opening of 3.3

square cm.  Tricuspid valve shows mild tricuspid regurgitation.  Right

ventricular systolic pressure of 17 mm, thickening of anterior right ventricular

wall, top normal right ventricular size.  Right atrium is enlarged in 4-chamber

view to 5.01 cm.  Left atrium is enlarged to 4.92 cm.  Left ventricle is

significantly enlarged to 6.44 cm, end diastolic dimension 5.05 cm, end-systolic

dimension with mild global hypokinesis, ejection fraction of 42% and increase in

end-diastolic volume to 141 mL.  IVC is top normal size.  Hence, picture

consistent with LV dilatation with mild global hypokinesis and left ventricular

hypertrophy, septum thicker than the posterior wall with an ejection fraction of

42% and increase in end-diastolic volume and right atrial enlargement and top

normal RV size with thickening of the anterior right ventricular wall,

suggestive of pressure overload, pulmonary hypertension, mild mitral and

tricuspid regurgitation.  No thrombus in any other cardiac chambers.  Increased

end-diastolic volume to 141 mL.





______________________________

Laxmichand MD DOROTHEA Walker:  TATE/maria eugenia  JOB# [id number]  [id number]

DD:  03/22/2021 11:11  DT:  03/22/2021 12:25

## 2021-03-22 NOTE — DIREP
PROCEDURE:NM LUNG SCAN PERFUSION/SANCHEZ

 

COMPARISON:[facility], CR, XRAY CHEST 2 VWS, 03/20/2021, 01:08 

AM.

 

INDICATIONS:Pulmonary Embolism

 

TECHNIQUE:After obtaining the patient's consent, a perfusion scan was obtained 

using 5.9 mCi Tc-99m MAA intravenous.

 

FINDINGS:

VENTILATION:Not performed per COVID-19 protocol.

PERFUSION:No significant perfusion defect is identified.

 

CONCLUSION:

1.  No significant perfusion defect is identified, indicating low probability 

for pulmonary embolus.

 

 

Dictated by: Aristeo Bonilla M.D.  On 03/22/2021 at 12:31 PM     

Electronically Signed By: Aristeo Bonilla M.D. on 03/22/2021 at 12:33 PM

## 2021-03-22 NOTE — NUR
DISCHARGE

PATIENT IV CATHETER REMOVED, TIP INTACT.  TELEMETRY REMOVED. DISCHARGE INSTRUCTIONS GIVEN 
INCLUDING NEW AND CONTINUED MEDICATIONS, FOLLOW UP APPOINTMENTS, DIET AND ACTIVITY AS 
TOLERATED. PATIENT AND SPOUSE VERBALIZED UNDERSTANDING.  PATIENT DENIES FURTHER NEEDS AT 
THIS TIME. PATIENT ESCORTED OFF UNIT VIA WHEELCHAIR BY CHELSY العلي LVN AND SPOUSE. 
PATIENT ASSISTED INTO CAR WITH SPOUSE.

## 2021-04-08 ENCOUNTER — HOSPITAL ENCOUNTER (OUTPATIENT)
Dept: HOSPITAL 65 - RAD | Age: 61
Discharge: HOME | End: 2021-04-08
Attending: INTERNAL MEDICINE
Payer: MEDICARE

## 2021-04-08 DIAGNOSIS — N20.0: Primary | ICD-10-CM

## 2021-04-08 PROCEDURE — 76770 US EXAM ABDO BACK WALL COMP: CPT

## 2021-04-08 NOTE — DIREP
PROCEDURE:US KIDNEYS-BILAT

 

COMPARISON:[facility], US, RENAL, 08/03/2016, 01:25 

PM.  [facility], CT, CT ABD/PELVIS W/O, 03/20/2021, 09:50 AM.

 

INDICATIONS:CALCULUS OF KIDNEY

 

TECHNIQUE:Ultrasound examination was performed of the kidneys and bladder.

 

FINDINGS:

 

RIGHT KIDNEY:10.2 x 6.3 x 5.2 cm. Cortex: 1.0 cm

LEFT KIDNEY: 11.3 x 5.3 x 5.0 cm. Cortex: 1.7 cm

 

BLADDER (pre-void):4.8 x 6.1 x 8.3 cm. Volume 169.1 ml

BLADDER (post-void): 5.5 x 5.8 x 8.4 cm. Volume 11.7 ml

MICTURATED VOLUME: 157.4 ml

 

RIGHT KIDNEY: There is no hydronephrosis or suspicious cortical lesion.  

LEFT KIDNEY: There is no suspicious cortical lesion or hydronephrosis.

BLADDER:Normal.  No visible wall thickening, mass, or calculus.  Bilateral 

ureteral jets visualized.

OTHER:Negative.

 

 

CONCLUSION:No abnormality noted.

 

 

 

Dictated by: Miriam HospitalA Physician on 04/08/2021 at 04:32 PM     

Electronically Signed By: CRISTIAN Muro M.D. on 04/08/2021 at 04:44 PM   

   

 

 

 

bs

## 2021-06-05 ENCOUNTER — HOSPITAL ENCOUNTER (EMERGENCY)
Dept: HOSPITAL 65 - ER | Age: 61
Discharge: HOME | End: 2021-06-05
Payer: MEDICARE

## 2021-06-05 VITALS — SYSTOLIC BLOOD PRESSURE: 174 MMHG | DIASTOLIC BLOOD PRESSURE: 93 MMHG

## 2021-06-05 VITALS — SYSTOLIC BLOOD PRESSURE: 131 MMHG | DIASTOLIC BLOOD PRESSURE: 69 MMHG

## 2021-06-05 DIAGNOSIS — Z79.51: ICD-10-CM

## 2021-06-05 DIAGNOSIS — M51.36: Primary | ICD-10-CM

## 2021-06-05 DIAGNOSIS — Z90.49: ICD-10-CM

## 2021-06-05 DIAGNOSIS — Z90.710: ICD-10-CM

## 2021-06-05 DIAGNOSIS — Z79.84: ICD-10-CM

## 2021-06-05 DIAGNOSIS — E07.9: ICD-10-CM

## 2021-06-05 DIAGNOSIS — Z88.8: ICD-10-CM

## 2021-06-05 DIAGNOSIS — Z79.52: ICD-10-CM

## 2021-06-05 DIAGNOSIS — Z79.899: ICD-10-CM

## 2021-06-05 DIAGNOSIS — Z88.2: ICD-10-CM

## 2021-06-05 DIAGNOSIS — Z88.5: ICD-10-CM

## 2021-06-05 DIAGNOSIS — Z88.0: ICD-10-CM

## 2021-06-05 DIAGNOSIS — Z88.6: ICD-10-CM

## 2021-06-05 DIAGNOSIS — J44.9: ICD-10-CM

## 2021-06-05 DIAGNOSIS — R79.1: ICD-10-CM

## 2021-06-05 DIAGNOSIS — I10: ICD-10-CM

## 2021-06-05 DIAGNOSIS — E78.00: ICD-10-CM

## 2021-06-05 DIAGNOSIS — M79.7: ICD-10-CM

## 2021-06-05 LAB
ALP INTEST CFR SERPL: 44 U/L (ref 50–136)
ALT SERPL-CCNC: 17 U/L (ref 12–78)
AST SERPL-CCNC: 10 U/L (ref 0–35)
BASOPHILS # BLD AUTO: 0 10^3/UL (ref 0–0.1)
BASOPHILS NFR BLD AUTO: 0.3 % (ref 0–0.2)
BILIRUB UR STRIP.AUTO-MCNC: NEGATIVE MG/DL
CALCIUM SERPL-MCNC: 9.8 MG/DL (ref 8.4–10.5)
CO2 BLDA-SCNC: 32 MMOL/L (ref 20–32)
COLOR UR: YELLOW
EOSINOPHIL # BLD AUTO: 0.2 10^3/UL (ref 0–0.2)
EOSINOPHIL NFR BLD AUTO: 3 % (ref 0–5)
ERYTHROCYTE [DISTWIDTH] IN BLOOD BY AUTOMATED COUNT: 13.6 % (ref 11.5–14.5)
GLUCOSE PRE 100 G GLC PO SERPL-MCNC: 134 MG/DL (ref 70–110)
LYMPHOCYTES # BLD AUTO: 2.45 10^3/UL1 (ref 1–4.8)
LYMPHOCYTES NFR BLD AUTO: 36.9 % (ref 24–44)
MCH RBC QN AUTO: 29.9 PG (ref 26–34)
MONOCYTES # BLD AUTO: 0.7 10^3/UL (ref 0.3–0.8)
MONOCYTES NFR BLD AUTO: 10.8 % (ref 5–12)
NEUTROPHILS # BLD AUTO: 3.2 10^3/UL (ref 1.8–7.7)
NEUTROPHILS NFR BLD AUTO: 48.5 % (ref 41–85)
PLATELET # BLD AUTO: 214 10^3/UL (ref 150–400)
UROBILINOGEN UR QL STRIP.AUTO: 0.2 E.U./DL

## 2021-06-05 PROCEDURE — 96374 THER/PROPH/DIAG INJ IV PUSH: CPT

## 2021-06-05 PROCEDURE — 96360 HYDRATION IV INFUSION INIT: CPT

## 2021-06-05 PROCEDURE — 74176 CT ABD & PELVIS W/O CONTRAST: CPT

## 2021-06-05 PROCEDURE — 99284 EMERGENCY DEPT VISIT MOD MDM: CPT

## 2021-06-05 PROCEDURE — 81003 URINALYSIS AUTO W/O SCOPE: CPT

## 2021-06-05 PROCEDURE — 96375 TX/PRO/DX INJ NEW DRUG ADDON: CPT

## 2021-06-05 PROCEDURE — 85730 THROMBOPLASTIN TIME PARTIAL: CPT

## 2021-06-05 PROCEDURE — 85610 PROTHROMBIN TIME: CPT

## 2021-06-05 PROCEDURE — 85025 COMPLETE CBC W/AUTO DIFF WBC: CPT

## 2021-06-05 PROCEDURE — 86677 HELICOBACTER PYLORI ANTIBODY: CPT

## 2021-06-05 PROCEDURE — 36415 COLL VENOUS BLD VENIPUNCTURE: CPT

## 2021-06-05 PROCEDURE — 96361 HYDRATE IV INFUSION ADD-ON: CPT

## 2021-06-05 PROCEDURE — 80053 COMPREHEN METABOLIC PANEL: CPT

## 2021-06-05 PROCEDURE — 82150 ASSAY OF AMYLASE: CPT

## 2021-06-05 PROCEDURE — 83690 ASSAY OF LIPASE: CPT

## 2021-06-05 PROCEDURE — 81001 URINALYSIS AUTO W/SCOPE: CPT

## 2021-06-05 PROCEDURE — 96376 TX/PRO/DX INJ SAME DRUG ADON: CPT

## 2021-06-05 NOTE — DIREP
PROCEDURE:CT ABDOMEN/PELVIS W/O CONTRAST

 

COMPARISON:[facility], CT, CT ABD/PELVIS W/O, 01/22/2021, 09:02 

PM.  [facility], CT, CT ABD/PELVIS W/O, 03/20/2021, 09:50 AM.

 

INDICATIONS:ABD PAIN

 

TECHNIQUE:Axial images were created through the abdomen and pelvis without 

intravenous contrast material. 

No oral contrast was administered.

Sagittal and coronal reconstructions were performed from source images.

 

FINDINGS:

LUNG BASES:Normal.  No visible pulmonary or pleural disease.   

LIVER:The liver is enlarged measuring approximately 24 cm in length.  No focal 

hepatic lesion is seen.

BILIARY:Postcholecystectomy changes are seen.

PANCREAS:Normal.  No lesion, fluid collection, ductal dilatation, or atrophy.  

 

SPLEEN:Normal.  No enlargement or focal lesion. 

ADRENALS:Normal.  No mass or enlargement.  

URINARY TRACT:Multiple bilateral renal calculi are seen largest in the right 

kidney measuring 6 mm.  No ureteral calculus or hydronephrosis is demonstrated. 

 Cortical scarring is again seen of both kidneys.

AORTA/VASCULAR:Atherosclerotic vascular calcification is seen of the abdominal 

aorta which is normal in caliber.

RETROPERITONEUM:Normal.  No mass or adenopathy.  

BOWEL/MESENTERY:Multiple diverticula are seen of the sigmoid colon.  No 

dilated loops of large or small bowel or inflammatory changes are seen.  The 

appendix is not visualized.

ABDOMINAL WALL:Normal.  No mass or hernia.  

PELVIC ORGANS:The uterus is absent.

BONES:Normal for age.  No bony lesion or acute fracture.  

OTHER:Negative.  

 

CONCLUSION:

1.  Bilateral nephrolithiasis.  No ureteral calculus or hydronephrosis is 

demonstrated.  Bilateral cortical scarring is again noted.

2.  Sigmoid diverticulosis without CT findings of diverticulitis.  No acute 

abnormalities are seen in the abdomen and pelvis.

3.  Hepatomegaly is noted.

 

 

Dictated by: Jean Terrell M.D.  On 06/05/2021 at 03:33 PM     

Electronically Signed By: Jean Terrell M.D. on 06/05/2021 at 03:42 PM

## 2021-06-05 NOTE — ER.PDOC
General


Chief Complaint:  Requesting Medical Care


Stated Complaint:  PAIN IN KIDNEYS


Time seen by MD:  14:33


Source:  patient


Exam Limitations:  no limitations





History of Present Illness


Timing/Duration:  24 hours


Severity/Quality:  moderate


Radiation:  no radiation


Associated Symptoms:  back pain


Exacerbated by:  nothing


Relieved By:  nothing


Allergies:  


Coded Allergies:  


     Penicillins (Verified  Allergy, Severe, unknown, 1/24/17)


   "CONVULSIONS"


     aspirin (Verified  Allergy, Severe, 1/24/17)


   "CONVULSIONS"


     cefamandole nafate (Verified  Allergy, Severe, 1/24/17)


     iodine (Verified  Allergy, Severe, Shortness of Breath, 1/24/17)


     quetiapine (Verified  Allergy, Severe, "I GO CRAZY, OUT OF MY HEAD", 

1/24/17)


     Cephalexin Monohydrate (Verified  Allergy, Intermediate, Hives, 1/24/17)


     egg (Verified  Allergy, Intermediate, Headache, 1/24/17)


     latex (Verified  Allergy, Intermediate, HIVES, 1/24/17)


     hydrocodone bitartrate (Verified  Allergy, Unknown, 1/24/17)


     phenazopyridine HCl (Verified  Allergy, Unknown, 1/24/17)


     codeine (Verified  Adverse Reaction, Mild, 1/24/17)


   "I GO CRAZY"


Uncoded Allergies:  


     SULFA (Allergy, Unknown, 5/28/14)


Home Meds


Active Scripts


Budesonide (PULMICORT) 0.5 Mg/2 Ml Ampul.neb, 0.5 MG IH RTBID for 30 Days, 

AMPULE


   Prov:ELKIN TOLENTINO MD         5/29/21


[Levofloxacin] 750 TABLET No Conflict Check, 500 MG PO DAILY for 5 Days


   Prov:ELKIN TOLENTINO MD         5/29/21


Prednisone (PREDNISONE) 20 Mg Tablet, 30 MG PO DAILY24 for 3 Days, TAB


   Prov:ELKIN TOLENTINO MD         5/29/21


Gabapentin (NEURONTIN) 300 Mg Capsule, 1 CAP PO TID for 30 Days, CAPSULE


   Prov:ELKIN TOLENTINO MD         5/29/21


Amlodipine Besylate (NORVASC) 5 Mg Tablet, 10 MG PO DAILY for 30 Days, TABLET


   Prov:DWAIN WINKLER MD         5/27/21


Amlodipine Besylate (NORVASC) 5 Mg Tablet, 10 MG PO DAILY for 30 Days, TABLET


   Prov:DWAIN WINKLER MD         5/27/21


[Flavoxate]   No Conflict Check, 100 MG PO TID for BLADDER SPASM, #30


   Prov:DAVY ROSENTHAL MD         1/20/21


Oxcarbazepine (OXCARBAZEPINE) 150 Mg Tablet, 300 MG PO DAILY for 30 Days, TAB


   Prov:DWAIN WINKLER MD         1/10/20


Losartan Potassium (COZAAR) 50 Mg Tablet, 100 MG PO DAILY for 30 Days, #30 TAB


   Prov:DWAIN WINKLER MD         1/10/20


Pantoprazole Sodium (PROTONIX) 40 Mg Tablet.dr, 40 MG PO DAILY for 30 Days, #30


   Prov:DWAIN WINKLER MD         12/5/19


Lancets (ACCU-CHEK) 1 Each Each, 1 EACH MC Q6 for 30 Days, #30 EACH


   Prov:DWAIN WINKLER MD         7/12/19


Reported Medications


Imatinib Mesylate (GLEEVEC) 400 Mg Tablet, 400 MG PO DAILY24, #30 TAB


   5/28/21


Amphet Asp/Amphet/D-Amphet (ADDERALL 30 MG TABLET) 30 Mg Tablet, 1 TAB PO 

DAILY24 MDD 2 Tablet(s) for 30 Days, #60 TAB 0 Refills


   5/27/21


Tramadol Hcl (TRAMADOL HCL) 50 Mg Tablet, 2 TAB PO QID for PAIN, #90 TAB


   5/27/21


Amlodipine Besylate (AMLODIPINE BESYLATE) 10 Mg Tablet, 1 TAB PO DAILY, #30 TAB 

5 Refills


   5/27/21


Furosemide (LASIX) 20 Mg Tablet, 20 MG PO TID, TAB


   5/27/21


Carvedilol 25MG (COREG 25MG) 25 Mg Tablet, 1 TAB PO BID, #60 TAB 5 Refills


   12/3/19


Rosuvastatin 10MG (CRESTOR 10MG) 10 Mg Tablet, 1 TAB PO DAILY, #30 TAB 5 Refills


   12/2/19


Fenofibrate (FENOFIBRATE) 160 Mg Tablet, 160 MG PO DAILY24, TABLET


   7/10/19


Potassium Citrate (POTASSIUM CITRATE) 10 Meq Tablet.er, 10 MEQ PO DAILY24


   8/6/18


Bupropion Hcl (BUPROPION XL) 300 Mg Tab.er.24h, 300 MG PO DAILY24


   8/6/18


Ondansetron (ZOFRAN ODT) 8 Mg Tab.rapdis, 8 MG PO DAILY24 for N/V, TAB


   8/6/18


Albuterol Sulfate (VENTOLIN HFA) 18 Gm Hfa.aer.ad, 90 MCG IH Q6HR PRN for 

SHORTNESS OF BREATH


   8/6/18


Allopurinol (ALLOPURINOL) 300 Mg Tablet, 1 TAB PO DAILY


   8/6/18


Duloxetine Hcl (CYMBALTA) 60 Mg Capsule.dr, 60 MG PO DAILY


   1/24/17


Tiotropium Bromide (SPIRIVA) 18 Mcg Cap.w.dev, 1 CAP IH DAILY


   10/14/16


Tizanidine Hcl (TIZANIDINE HCL) 4 Mg Tablet, 1 TAB PO TID PRN for MUSCLE SPASM


   9/24/15


Alprazolam (XANAX) 2 Mg Tablet, 2 MG PO TID, TABLET


   11/17/14


Linaclotide (LINZESS) 290 Mcg Capsule, 290 MCG PO DAILY, CAPSULE


   11/17/14


Cholecalciferol (Vitamin D3) (VITAMIN D) 10,000 Unit Capsule, 91094 UNIT PO 3 

TIMES A WEEK, CAPSULE


   11/17/14


Levothyroxine Sodium (LEVOTHYROXINE SODIUM) 200 Mcg Tablet, 150 MCG PO DAILY, 

TABLET


   6/13/14


Discontinued Reported Medications


Metformin Hcl (METFORMIN HCL) 500 Mg Tablet, 1 TAB PO BID, #60 TAB 3 Refills


   12/2/19


Furosemide (LASIX) 20 Mg Tablet, 1 TAB PO DAILY


   9/24/15


Vital Signs





First Vital Signs








  Date Time  Temp Pulse Resp B/P (MAP) Pulse Ox O2 Delivery O2 Flow Rate FiO2


 


5/29/21 08:42  80      


 


6/5/21 14:10 97.8  18     


 


6/5/21 14:10    174/93 (120) 93 Nasal Canula 2.00 








Last Vital Signs








  Date Time  Temp Pulse Resp B/P (MAP) Pulse Ox O2 Delivery O2 Flow Rate FiO2


 


6/5/21 14:10 97.8 97 18  93   


 


6/5/21 14:10    174/93 (120)  Nasal Canula 2.00 











Past Medical History


Medical History:  cancer, COPD, fibromyalgia, high cholesterol, hypertension, 

thyroid disease


Surgical History:  appendectomy, cholecystectomy, hysterectomy, tonsillectomy





Social History


Drug Use:  none





Reviewed


Nursing Reviewed:  Vital Signs, Abn. Noted





All Other Systems:  Reviewed and Negative





Physical Exam


General Appearance:  No Apparent Distress, WD/WN


HEENT:  PERRL/EOMI, Normal ENT Inspection, TMs Normal, Pharynx Normal


Neck:  Non-Tender, Full Range of Motion, Supple, Normal Inspection


Respiratory:  chest non-tender, lungs clear, normal breath sounds, no 

respiratory distress, no accessory muscle use


Cardiovascular:  Normal Peripheral Pulses, Regular Rate, Rhythm, No Edema, No 

Gallop, No JVD, No Murmur


Gastrointestinal:  Tenderness


Back:  Normal Inspection, No CVA Tenderness, No Vertebral Tenderness


Extremities:  Normal Range of Motion, Non-Tender, Normal Inspection, No Pedal 

Edema, No Calf Tenderness, Normal Capillary Refill, Pelvis Stable


Neurologic/Psychiatric:  CNs II-XII NML as Tested, No Motor/Sensory Deficits, 

Alert, Normal Mood/Affect, Oriented x 3


Skin:  Normal Color, Warm/Dry


Lymphatic:  No Adenopathy





Results/Orders


Results/Orders





Orders - CHULA FLEMING MD


Cbc With Auto Diff (6/5/21 14:29)


Comprehensive Metabolic Panel (6/5/21 14:29)


Amylase (6/5/21 14:29)


Lipase (6/5/21 14:29)


Helicobacter Pylori (6/5/21 14:29)


PT (6/5/21 14:29)


Partial Thromboplastin Time. (6/5/21 14:29)


Urinalysis (6/5/21 14:29)


Saline Lock (6/5/21 14:29)


Morphine Sulfate (Morphine Sulfate) (6/5/21 14:31)


Ondansetron Hcl/Pf (Zofran) (6/5/21 14:31)


0.9 % Sodium Chloride (Ns 1000ml) (6/5/21 15:00)


Morphine Sulfate (Morphine Sulfate) (6/5/21 14:45)


Ct Abd/Pelvis Wo Iv Contrast (6/5/21 15:05)





Vital Signs








  Date Time  Temp Pulse Resp B/P (MAP) Pulse Ox O2 Delivery O2 Flow Rate FiO2


 


6/5/21 14:10 97.8 97 18  93   


 


6/5/21 14:10 97.8 97 18 174/93 (120) 93 Nasal Canula 2.00 


 


6/5/21 14:10 97.8 97 18     


 


5/29/21 08:42  80      








Administered Medications








 Medications


  (Trade)  Dose


 Ordered  Sig/Tesfaye


 Route


 PRN Reason  Start Time


 Stop Time Status Last Admin


Dose Admin


 


 Morphine Sulfate


  (Morphine


 Sulfate)  4 mg  STAT  STAT


 IV


   6/5/21 14:31


 6/5/21 14:32 UNV 6/5/21 14:51


4 MG


 


 Ondansetron HCl


  (Zofran)  4 mg  STAT  STAT


 IV


   6/5/21 14:31


 6/5/21 14:32 UNV 6/5/21 14:50


4 MG


 


 Sodium Chloride  1,000 ml @ 


 0 mls/hr  Q0M ONCE


 IV


   6/5/21 15:00


 6/5/21 15:01 UNV 6/5/21 14:50


1,000 MLS/HR








                                Laboratory Tests








Test


 6/5/21


14:25


 


White Blood Count


 6.6 10^3/uL


(4.5-11.0)


 


Red Blood Count


 3.65 10^6/uL


(4.00-5.20)  L


 


Hemoglobin


 10.9 g/dL


(12.0-15.0)  L


 


Hematocrit


 36.1 %


(36.0-46.0)


 


Mean Corpuscular Volume


 98.9 fL


()


 


Mean Corpuscular Hemoglobin


 29.9 pg


(26-34)


 


Mean Corpuscular Hemoglobin


Concent 30.2 g/dL


(33-36.5)  L


 


Red Cell Distribution Width


 13.6 %


(11.5-14.5)


 


Platelet Count


 214 10^3/uL


(150-400)


 


Mean Platelet Volume


 9.3 fL


(7.8-11.0)


 


Neutrophils (%) (Auto)


 48.5 %


(41.0-85.0)


 


Lymphocytes (%) (Auto)


 36.9 %


(24.0-44.0)


 


Monocytes (%) (Auto)


 10.8 %


(5.0-12.0)


 


Neutrophils # (Auto)


 3.2 10^3/uL


(1.8-7.7)


 


Lymphocytes # (Auto)


 2.45 10^3/uL1


(1.0-4.8)


 


Monocytes # (Auto)


 0.7 10^3/uL


(0.3-0.8)


 


Absolute Immature Granulocyte


(auto 0.03 10^3 u/L


(0-2)


 


Absolute Eosinophils (auto)


 0.2 10^3/uL


(0.0-0.2)


 


Immature Granulocytes %


 0.50 %


(0.00-0.50)


 


Eosinophils %


 3.0 %


(0.0-5.0)


 


Basophils %


 0.3 %


(0.0-0.2)  H


 


Basophils #


 0.0 10^3/uL


(0.0-0.1)


 


Prothrombin Time


 11.4 SEC


(9.6-12.0)


 


Prothrombin Time INR


(Non-Therap) 1.1  





 


Activated Partial


Thromboplast Time 21.4 SEC


(24.67-30.72)


 


Urine Collection Type CCMS  


 


Urine Color YELLOW  


 


Urine Appearance CLEAR  


 


Urine Bilirubin


 NEGATIVE


(NEGATIVE)


 


Urine Ketones


 NEGATIVE


(NEGATIVE)


 


Urine Specific Gravity


 1.025


(1.005-1.030)


 


Urine pH 5.0 (4.5-8.0)  


 


Urine Protein


 NEGATIVE


(NEGATIVE)


 


Urine Urobilinogen


 0.2 E.U./dL


(0.2)


 


Urine Nitrate


 NEGATIVE


(NEGATIVE)


 


Urine Leukocyte Esterase


 NEGATIVE


(NEGATIVE)


 


Urine Glucose (Auto)(UA)


 NEGATIVE


(NEGATIVE)


 


Urine Blood


 NEGATIVE


(NEGATIVE)


 


Sodium Level


 143 mmol/L


(132-145)


 


Potassium Level


 3.7 mmol/L


(3.6-5.2)


 


Chloride Level


 102.0 mmol/L


()


 


Carbon Dioxide Level


 32.0 mmol/L


(20.0-32)


 


Anion Gap 12.7  


 


Blood Urea Nitrogen


 24 mg/dL


(7-18)  H


 


Creatinine


 1.80 mg/dL


(0.59-1.40)  H


 


Estimated GFR (


American) 35.0 (>/=60)  





 


Est GFR (CKD-EPI)(Non-Afr


American) 28.9 (>/=60)  





 


BUN/Creatinine Ratio 13.0  


 


Glucose Level


 134 mg/dL


()  H


 


Calcium Level


 9.8 mg/dL


(8.4-10.5)


 


Total Bilirubin


 0.2 mg/dL


(0.2-1.0)


 


Aspartate Amino Transferase


(AST) 10 U/L (0-35)  





 


Alanine Aminotransferase (ALT)


 17 U/L (12-78)





 


Alkaline Phosphatase


 44 U/L


()  L


 


Total Protein


 7.3 g/dL


(6.4-8.2)


 


Albumin


 4.0 g/dL


(3.4-5.0)


 


Globulin 3.3  


 


Albumin/Globulin Ratio 1.212  


 


Amylase Level


 47 U/L


()


 


Lipase


 174 U/L


(114-286)


 


Helicobacter pylori Screen


 POSITIVE


(NEGATIVE)











EKG/XRAY/CT/US


EKG:  NSR, no ST T wave changes





ER DEPART


Departure


Time of Disposition:  16:11


Disposition:  01 HOME / SELF CARE / HOMELESS


Impression:  


   Primary Impression:  


   Degenerative disc disease, lumbar


Condition:  Improved


Referrals:  


MARISABEL CONTEH MD (PCP)


PRIMARY CARE PROVIDER


Duration or Time Spent with Pa:  Marcusm











CHULA FLEMING MD               Jun 5, 2021 14:34